# Patient Record
Sex: MALE | Race: WHITE | Employment: OTHER | URBAN - METROPOLITAN AREA
[De-identification: names, ages, dates, MRNs, and addresses within clinical notes are randomized per-mention and may not be internally consistent; named-entity substitution may affect disease eponyms.]

---

## 2017-10-11 ENCOUNTER — HOSPITAL ENCOUNTER (OUTPATIENT)
Dept: ULTRASOUND IMAGING | Age: 77
Discharge: HOME | End: 2017-10-11
Attending: NURSE PRACTITIONER
Payer: MEDICARE

## 2017-10-11 DIAGNOSIS — M79.604 RIGHT LEG PAIN: ICD-10-CM

## 2017-10-11 DIAGNOSIS — M79.89 RIGHT LEG SWELLING: ICD-10-CM

## 2017-10-11 PROCEDURE — 93971 EXTREMITY STUDY: CPT

## 2019-01-01 ENCOUNTER — HOSPITAL ENCOUNTER (OUTPATIENT)
Dept: LAB | Age: 79
Discharge: HOME | End: 2019-05-10

## 2019-01-01 ENCOUNTER — HOSPITAL ENCOUNTER (INPATIENT)
Age: 79
LOS: 5 days | End: 2019-08-19
Attending: INTERNAL MEDICINE | Admitting: INTERNAL MEDICINE

## 2019-01-01 ENCOUNTER — HOSPICE ADMISSION (OUTPATIENT)
Dept: HOSPICE | Facility: HOSPICE | Age: 79
End: 2019-01-01
Payer: MEDICARE

## 2019-01-01 VITALS
WEIGHT: 147 LBS | RESPIRATION RATE: 14 BRPM | HEIGHT: 70 IN | BODY MASS INDEX: 21.05 KG/M2 | DIASTOLIC BLOOD PRESSURE: 53 MMHG | TEMPERATURE: 96.1 F | SYSTOLIC BLOOD PRESSURE: 69 MMHG | HEART RATE: 105 BPM

## 2019-01-01 DIAGNOSIS — F02.818 LATE ONSET ALZHEIMER'S DISEASE WITH BEHAVIORAL DISTURBANCE (HCC): ICD-10-CM

## 2019-01-01 DIAGNOSIS — J18.9 HEALTHCARE-ASSOCIATED PNEUMONIA: ICD-10-CM

## 2019-01-01 DIAGNOSIS — G30.1 LATE ONSET ALZHEIMER'S DISEASE WITH BEHAVIORAL DISTURBANCE (HCC): ICD-10-CM

## 2019-01-01 DIAGNOSIS — J96.01 ACUTE RESPIRATORY FAILURE WITH HYPOXIA (HCC): ICD-10-CM

## 2019-01-01 LAB
BASOPHILS # BLD: 0 K/UL (ref 0–0.2)
BASOPHILS NFR BLD: 0 % (ref 0–2)
DIFFERENTIAL METHOD BLD: ABNORMAL
EOSINOPHIL # BLD: 0.1 K/UL (ref 0–0.8)
EOSINOPHIL NFR BLD: 1 % (ref 0.5–7.8)
ERYTHROCYTE [DISTWIDTH] IN BLOOD BY AUTOMATED COUNT: 13.3 % (ref 11.9–14.6)
HCT VFR BLD AUTO: 43.6 % (ref 41.1–50.3)
HGB BLD-MCNC: 14 G/DL (ref 13.6–17.2)
IMM GRANULOCYTES # BLD AUTO: 0.1 K/UL (ref 0–0.5)
IMM GRANULOCYTES NFR BLD AUTO: 1 % (ref 0–5)
LYMPHOCYTES # BLD: 1.4 K/UL (ref 0.5–4.6)
LYMPHOCYTES NFR BLD: 18 % (ref 13–44)
MCH RBC QN AUTO: 31.1 PG (ref 26.1–32.9)
MCHC RBC AUTO-ENTMCNC: 32.1 G/DL (ref 31.4–35)
MCV RBC AUTO: 96.9 FL (ref 79.6–97.8)
MONOCYTES # BLD: 0.8 K/UL (ref 0.1–1.3)
MONOCYTES NFR BLD: 11 % (ref 4–12)
NEUTS SEG # BLD: 5.3 K/UL (ref 1.7–8.2)
NEUTS SEG NFR BLD: 70 % (ref 43–78)
NRBC # BLD: 0 K/UL (ref 0–0.2)
PLATELET # BLD AUTO: 289 K/UL (ref 150–450)
PMV BLD AUTO: 9.2 FL (ref 9.4–12.3)
RBC # BLD AUTO: 4.5 M/UL (ref 4.23–5.6)
WBC # BLD AUTO: 7.7 K/UL (ref 4.3–11.1)

## 2019-01-01 PROCEDURE — 0656 HSPC GENERAL INPATIENT

## 2019-01-01 PROCEDURE — G0156 HHCP-SVS OF AIDE,EA 15 MIN: HCPCS

## 2019-01-01 PROCEDURE — G0299 HHS/HOSPICE OF RN EA 15 MIN: HCPCS

## 2019-01-01 PROCEDURE — 74011250637 HC RX REV CODE- 250/637: Performed by: INTERNAL MEDICINE

## 2019-01-01 PROCEDURE — 99222 1ST HOSP IP/OBS MODERATE 55: CPT | Performed by: INTERNAL MEDICINE

## 2019-01-01 PROCEDURE — 74011250636 HC RX REV CODE- 250/636: Performed by: INTERNAL MEDICINE

## 2019-01-01 PROCEDURE — 74011250636 HC RX REV CODE- 250/636: Performed by: NURSE PRACTITIONER

## 2019-01-01 PROCEDURE — 74011000250 HC RX REV CODE- 250: Performed by: NURSE PRACTITIONER

## 2019-01-01 PROCEDURE — G0155 HHCP-SVS OF CSW,EA 15 MIN: HCPCS

## 2019-01-01 PROCEDURE — 85025 COMPLETE CBC W/AUTO DIFF WBC: CPT

## 2019-01-01 PROCEDURE — 99232 SBSQ HOSP IP/OBS MODERATE 35: CPT | Performed by: INTERNAL MEDICINE

## 2019-01-01 PROCEDURE — 3336500001 HSPC ELECTION

## 2019-01-01 PROCEDURE — 74011000250 HC RX REV CODE- 250: Performed by: INTERNAL MEDICINE

## 2019-01-01 RX ORDER — HALOPERIDOL 5 MG/ML
2 INJECTION INTRAMUSCULAR
Status: DISCONTINUED | OUTPATIENT
Start: 2019-01-01 | End: 2019-01-01 | Stop reason: HOSPADM

## 2019-01-01 RX ORDER — LEVALBUTEROL 1.25 MG/.5ML
1.25 SOLUTION, CONCENTRATE RESPIRATORY (INHALATION)
COMMUNITY

## 2019-01-01 RX ORDER — MORPHINE SULFATE 4 MG/ML
4 INJECTION INTRAVENOUS
Status: DISCONTINUED | OUTPATIENT
Start: 2019-01-01 | End: 2019-01-01

## 2019-01-01 RX ORDER — HALOPERIDOL 5 MG/ML
2 INJECTION INTRAMUSCULAR
Status: DISCONTINUED | OUTPATIENT
Start: 2019-01-01 | End: 2019-01-01

## 2019-01-01 RX ORDER — SODIUM CHLORIDE 0.9 % (FLUSH) 0.9 %
3 SYRINGE (ML) INJECTION AS NEEDED
Status: DISCONTINUED | OUTPATIENT
Start: 2019-01-01 | End: 2019-01-01

## 2019-01-01 RX ORDER — ACETAMINOPHEN 325 MG/1
650 TABLET ORAL EVERY 8 HOURS
COMMUNITY

## 2019-01-01 RX ORDER — POLYETHYLENE GLYCOL 3350 17 G/17G
17 POWDER, FOR SOLUTION ORAL
COMMUNITY

## 2019-01-01 RX ORDER — GLYCOPYRROLATE 0.2 MG/ML
0.2 INJECTION INTRAMUSCULAR; INTRAVENOUS
Status: DISCONTINUED | OUTPATIENT
Start: 2019-01-01 | End: 2019-01-01

## 2019-01-01 RX ORDER — HALOPERIDOL 2 MG/ML
4 SOLUTION ORAL 2 TIMES DAILY
COMMUNITY

## 2019-01-01 RX ORDER — HYOSCYAMINE SULFATE 0.12 MG/1
0.12 TABLET SUBLINGUAL
Status: DISCONTINUED | OUTPATIENT
Start: 2019-01-01 | End: 2019-01-01

## 2019-01-01 RX ORDER — SENNOSIDES 8.6 MG/1
1 TABLET ORAL 2 TIMES DAILY
Status: DISCONTINUED | OUTPATIENT
Start: 2019-01-01 | End: 2019-01-01

## 2019-01-01 RX ORDER — MORPHINE SULFATE 2 MG/ML
2 INJECTION, SOLUTION INTRAMUSCULAR; INTRAVENOUS
Status: DISCONTINUED | OUTPATIENT
Start: 2019-01-01 | End: 2019-01-01

## 2019-01-01 RX ORDER — LORAZEPAM 2 MG/ML
0.5 INJECTION INTRAMUSCULAR
Status: DISCONTINUED | OUTPATIENT
Start: 2019-01-01 | End: 2019-01-01

## 2019-01-01 RX ORDER — LORAZEPAM 2 MG/ML
1 INJECTION INTRAMUSCULAR
Status: DISCONTINUED | OUTPATIENT
Start: 2019-01-01 | End: 2019-01-01

## 2019-01-01 RX ORDER — MORPHINE SULFATE 100 MG/5ML
10 SOLUTION ORAL
Status: DISCONTINUED | OUTPATIENT
Start: 2019-01-01 | End: 2019-01-01

## 2019-01-01 RX ORDER — GLYCOPYRROLATE 0.2 MG/ML
0.2 INJECTION INTRAMUSCULAR; INTRAVENOUS
Status: DISCONTINUED | OUTPATIENT
Start: 2019-01-01 | End: 2019-01-01 | Stop reason: HOSPADM

## 2019-01-01 RX ORDER — BUSPIRONE HYDROCHLORIDE 5 MG/1
5 TABLET ORAL 2 TIMES DAILY
COMMUNITY

## 2019-01-01 RX ORDER — DIVALPROEX SODIUM 500 MG/1
500 TABLET, DELAYED RELEASE ORAL 2 TIMES DAILY WITH MEALS
COMMUNITY

## 2019-01-01 RX ORDER — MORPHINE SULFATE 10 MG/ML
7 INJECTION, SOLUTION INTRAMUSCULAR; INTRAVENOUS
Status: DISCONTINUED | OUTPATIENT
Start: 2019-01-01 | End: 2019-01-01 | Stop reason: HOSPADM

## 2019-01-01 RX ORDER — HALOPERIDOL 5 MG/ML
5 INJECTION INTRAMUSCULAR EVERY 12 HOURS
Status: DISCONTINUED | OUTPATIENT
Start: 2019-01-01 | End: 2019-01-01

## 2019-01-01 RX ORDER — OLOPATADINE HYDROCHLORIDE 2 MG/ML
1 SOLUTION/ DROPS OPHTHALMIC DAILY
COMMUNITY

## 2019-01-01 RX ORDER — ACETAMINOPHEN 325 MG/1
650 TABLET ORAL
Status: DISCONTINUED | OUTPATIENT
Start: 2019-01-01 | End: 2019-01-01

## 2019-01-01 RX ORDER — DIVALPROEX SODIUM 250 MG/1
750 TABLET, DELAYED RELEASE ORAL
COMMUNITY

## 2019-01-01 RX ORDER — FENTANYL 25 UG/1
1 PATCH TRANSDERMAL
Status: DISCONTINUED | OUTPATIENT
Start: 2019-01-01 | End: 2019-01-01 | Stop reason: HOSPADM

## 2019-01-01 RX ORDER — LORAZEPAM 2 MG/ML
2 INJECTION INTRAMUSCULAR
Status: DISCONTINUED | OUTPATIENT
Start: 2019-01-01 | End: 2019-01-01 | Stop reason: HOSPADM

## 2019-01-01 RX ORDER — FENTANYL 12.5 UG/1
1 PATCH TRANSDERMAL
Status: DISCONTINUED | OUTPATIENT
Start: 2019-01-01 | End: 2019-01-01

## 2019-01-01 RX ORDER — ACETAMINOPHEN 650 MG/1
650 SUPPOSITORY RECTAL
Status: DISCONTINUED | OUTPATIENT
Start: 2019-01-01 | End: 2019-01-01 | Stop reason: HOSPADM

## 2019-01-01 RX ORDER — POLYVINYL ALCOHOL 14 MG/ML
1 SOLUTION/ DROPS OPHTHALMIC AS NEEDED
Status: DISCONTINUED | OUTPATIENT
Start: 2019-01-01 | End: 2019-01-01 | Stop reason: HOSPADM

## 2019-01-01 RX ORDER — HALOPERIDOL 5 MG/ML
5 INJECTION INTRAMUSCULAR EVERY 8 HOURS
Status: DISCONTINUED | OUTPATIENT
Start: 2019-01-01 | End: 2019-01-01 | Stop reason: HOSPADM

## 2019-01-01 RX ORDER — LOPERAMIDE HYDROCHLORIDE 2 MG/1
4 CAPSULE ORAL AS NEEDED
Status: DISCONTINUED | OUTPATIENT
Start: 2019-01-01 | End: 2019-01-01

## 2019-01-01 RX ORDER — FACIAL-BODY WIPES
10 EACH TOPICAL AS NEEDED
Status: DISCONTINUED | OUTPATIENT
Start: 2019-01-01 | End: 2019-01-01 | Stop reason: HOSPADM

## 2019-01-01 RX ORDER — SENNOSIDES 8.6 MG/1
2 TABLET ORAL DAILY
COMMUNITY

## 2019-01-01 RX ORDER — LORAZEPAM 2 MG/ML
2 INJECTION INTRAMUSCULAR
Status: DISCONTINUED | OUTPATIENT
Start: 2019-01-01 | End: 2019-01-01

## 2019-01-01 RX ADMIN — GLYCOPYRROLATE 0.2 MG: 0.2 INJECTION INTRAMUSCULAR; INTRAVENOUS at 12:46

## 2019-01-01 RX ADMIN — MORPHINE SULFATE 4 MG: 4 INJECTION INTRAVENOUS at 06:17

## 2019-01-01 RX ADMIN — ACETAMINOPHEN 650 MG: 650 SUPPOSITORY RECTAL at 14:08

## 2019-01-01 RX ADMIN — MORPHINE SULFATE 4 MG: 4 INJECTION INTRAVENOUS at 19:29

## 2019-01-01 RX ADMIN — HALOPERIDOL LACTATE 5 MG: 5 INJECTION, SOLUTION INTRAMUSCULAR at 16:47

## 2019-01-01 RX ADMIN — MORPHINE SULFATE 2 MG: 2 INJECTION, SOLUTION INTRAMUSCULAR; INTRAVENOUS at 06:16

## 2019-01-01 RX ADMIN — MORPHINE SULFATE 4 MG: 4 INJECTION INTRAVENOUS at 05:10

## 2019-01-01 RX ADMIN — LORAZEPAM 2 MG: 2 INJECTION INTRAMUSCULAR; INTRAVENOUS at 06:17

## 2019-01-01 RX ADMIN — GLYCOPYRROLATE 0.2 MG: 0.2 INJECTION INTRAMUSCULAR; INTRAVENOUS at 06:16

## 2019-01-01 RX ADMIN — HALOPERIDOL LACTATE 2 MG: 5 INJECTION, SOLUTION INTRAMUSCULAR at 03:28

## 2019-01-01 RX ADMIN — HALOPERIDOL LACTATE 5 MG: 5 INJECTION, SOLUTION INTRAMUSCULAR at 02:00

## 2019-01-01 RX ADMIN — HALOPERIDOL LACTATE 2 MG: 5 INJECTION, SOLUTION INTRAMUSCULAR at 05:57

## 2019-01-01 RX ADMIN — MORPHINE SULFATE 2 MG: 2 INJECTION, SOLUTION INTRAMUSCULAR; INTRAVENOUS at 16:26

## 2019-01-01 RX ADMIN — MORPHINE SULFATE 7 MG: 10 INJECTION INTRAVENOUS at 16:03

## 2019-01-01 RX ADMIN — HALOPERIDOL LACTATE 2 MG: 5 INJECTION, SOLUTION INTRAMUSCULAR at 07:08

## 2019-01-01 RX ADMIN — HALOPERIDOL LACTATE 2 MG: 5 INJECTION, SOLUTION INTRAMUSCULAR at 06:33

## 2019-01-01 RX ADMIN — MORPHINE SULFATE 2 MG: 2 INJECTION, SOLUTION INTRAMUSCULAR; INTRAVENOUS at 07:40

## 2019-01-01 RX ADMIN — HALOPERIDOL LACTATE 2 MG: 5 INJECTION, SOLUTION INTRAMUSCULAR at 13:52

## 2019-01-01 RX ADMIN — MORPHINE SULFATE 4 MG: 4 INJECTION INTRAVENOUS at 06:32

## 2019-01-01 RX ADMIN — MORPHINE SULFATE 4 MG: 4 INJECTION INTRAVENOUS at 01:34

## 2019-01-01 RX ADMIN — GLYCOPYRROLATE 0.2 MG: 0.2 INJECTION INTRAMUSCULAR; INTRAVENOUS at 05:10

## 2019-01-01 RX ADMIN — HALOPERIDOL LACTATE 2 MG: 5 INJECTION, SOLUTION INTRAMUSCULAR at 18:21

## 2019-01-01 RX ADMIN — MORPHINE SULFATE 4 MG: 4 INJECTION INTRAVENOUS at 08:26

## 2019-01-01 RX ADMIN — LORAZEPAM 2 MG: 2 INJECTION INTRAMUSCULAR; INTRAVENOUS at 13:24

## 2019-01-01 RX ADMIN — HALOPERIDOL LACTATE 2 MG: 5 INJECTION, SOLUTION INTRAMUSCULAR at 07:39

## 2019-01-01 RX ADMIN — MORPHINE SULFATE 7 MG: 10 INJECTION INTRAVENOUS at 04:29

## 2019-01-01 RX ADMIN — MORPHINE SULFATE 2 MG: 2 INJECTION, SOLUTION INTRAMUSCULAR; INTRAVENOUS at 12:07

## 2019-01-01 RX ADMIN — LORAZEPAM 2 MG: 2 INJECTION INTRAMUSCULAR; INTRAVENOUS at 08:51

## 2019-01-01 RX ADMIN — HALOPERIDOL LACTATE 2 MG: 5 INJECTION, SOLUTION INTRAMUSCULAR at 11:42

## 2019-01-01 RX ADMIN — MORPHINE SULFATE 4 MG: 4 INJECTION INTRAVENOUS at 11:41

## 2019-01-01 RX ADMIN — GLYCOPYRROLATE 0.2 MG: 0.2 INJECTION INTRAMUSCULAR; INTRAVENOUS at 13:00

## 2019-01-01 RX ADMIN — GLYCOPYRROLATE 0.2 MG: 0.2 INJECTION INTRAMUSCULAR; INTRAVENOUS at 14:09

## 2019-01-01 RX ADMIN — MORPHINE SULFATE 7 MG: 10 INJECTION INTRAVENOUS at 07:00

## 2019-01-01 RX ADMIN — MORPHINE SULFATE 2 MG: 2 INJECTION, SOLUTION INTRAMUSCULAR; INTRAVENOUS at 20:34

## 2019-01-01 RX ADMIN — MORPHINE SULFATE 4 MG: 4 INJECTION INTRAVENOUS at 14:08

## 2019-01-01 RX ADMIN — MORPHINE SULFATE 7 MG: 10 INJECTION INTRAVENOUS at 16:48

## 2019-01-01 RX ADMIN — LORAZEPAM 1 MG: 2 INJECTION INTRAMUSCULAR; INTRAVENOUS at 14:09

## 2019-01-01 RX ADMIN — HALOPERIDOL LACTATE 2 MG: 5 INJECTION, SOLUTION INTRAMUSCULAR at 18:02

## 2019-01-01 RX ADMIN — HALOPERIDOL LACTATE 2 MG: 5 INJECTION, SOLUTION INTRAMUSCULAR at 17:14

## 2019-01-01 RX ADMIN — MORPHINE SULFATE 2 MG: 2 INJECTION, SOLUTION INTRAMUSCULAR; INTRAVENOUS at 20:10

## 2019-01-01 RX ADMIN — LORAZEPAM 1 MG: 2 INJECTION INTRAMUSCULAR; INTRAVENOUS at 19:26

## 2019-01-01 RX ADMIN — MORPHINE SULFATE 4 MG: 4 INJECTION INTRAVENOUS at 18:02

## 2019-01-01 RX ADMIN — GLYCOPYRROLATE 0.2 MG: 0.2 INJECTION INTRAMUSCULAR; INTRAVENOUS at 01:34

## 2019-01-01 RX ADMIN — MORPHINE SULFATE 4 MG: 4 INJECTION INTRAVENOUS at 18:22

## 2019-01-01 RX ADMIN — GLYCOPYRROLATE 0.2 MG: 0.2 INJECTION INTRAMUSCULAR; INTRAVENOUS at 19:28

## 2019-01-01 RX ADMIN — MORPHINE SULFATE 4 MG: 4 INJECTION INTRAVENOUS at 07:07

## 2019-01-01 RX ADMIN — MORPHINE SULFATE 7 MG: 10 INJECTION INTRAVENOUS at 08:03

## 2019-01-01 RX ADMIN — HALOPERIDOL LACTATE 5 MG: 5 INJECTION, SOLUTION INTRAMUSCULAR at 00:50

## 2019-01-01 RX ADMIN — HALOPERIDOL LACTATE 5 MG: 5 INJECTION, SOLUTION INTRAMUSCULAR at 16:00

## 2019-01-01 RX ADMIN — GLYCOPYRROLATE 0.2 MG: 0.2 INJECTION INTRAMUSCULAR; INTRAVENOUS at 08:04

## 2019-01-01 RX ADMIN — MORPHINE SULFATE 4 MG: 4 INJECTION INTRAVENOUS at 03:28

## 2019-01-01 RX ADMIN — LORAZEPAM 1 MG: 2 INJECTION INTRAMUSCULAR; INTRAVENOUS at 19:28

## 2019-01-01 RX ADMIN — HALOPERIDOL LACTATE 5 MG: 5 INJECTION, SOLUTION INTRAMUSCULAR at 08:04

## 2019-01-01 RX ADMIN — HALOPERIDOL LACTATE 5 MG: 5 INJECTION INTRAMUSCULAR at 21:00

## 2019-01-01 RX ADMIN — MORPHINE SULFATE 4 MG: 4 INJECTION INTRAVENOUS at 13:00

## 2019-01-01 RX ADMIN — MORPHINE SULFATE 7 MG: 10 INJECTION INTRAVENOUS at 12:46

## 2019-01-01 RX ADMIN — HALOPERIDOL LACTATE 5 MG: 5 INJECTION INTRAMUSCULAR at 08:26

## 2019-01-01 RX ADMIN — GLYCOPYRROLATE 0.2 MG: 0.2 INJECTION INTRAMUSCULAR; INTRAVENOUS at 12:00

## 2019-01-01 RX ADMIN — HALOPERIDOL LACTATE 2 MG: 5 INJECTION, SOLUTION INTRAMUSCULAR at 06:17

## 2019-01-01 RX ADMIN — HALOPERIDOL LACTATE 5 MG: 5 INJECTION, SOLUTION INTRAMUSCULAR at 08:17

## 2019-01-01 RX ADMIN — GLYCOPYRROLATE 0.2 MG: 0.2 INJECTION INTRAMUSCULAR; INTRAVENOUS at 20:13

## 2019-01-01 RX ADMIN — MORPHINE SULFATE 7 MG: 10 INJECTION INTRAVENOUS at 03:10

## 2019-08-13 PROBLEM — J18.9 HEALTHCARE-ASSOCIATED PNEUMONIA: Status: ACTIVE | Noted: 2019-01-01

## 2019-08-13 PROBLEM — G30.9 ALZHEIMER'S DISEASE (HCC): Status: ACTIVE | Noted: 2019-01-01

## 2019-08-13 PROBLEM — J96.91 RESPIRATORY FAILURE WITH HYPOXIA (HCC): Status: ACTIVE | Noted: 2019-01-01

## 2019-08-13 PROBLEM — F02.80 ALZHEIMER'S DISEASE (HCC): Status: ACTIVE | Noted: 2019-01-01

## 2019-08-14 PROBLEM — A41.9 SEVERE SEPSIS (HCC): Status: ACTIVE | Noted: 2019-01-01

## 2019-08-14 PROBLEM — R45.1 RESTLESSNESS AND AGITATION: Status: ACTIVE | Noted: 2017-11-30

## 2019-08-14 PROBLEM — R53.81 DEBILITY: Status: ACTIVE | Noted: 2017-11-05

## 2019-08-14 PROBLEM — I25.810 CORONARY ARTERY DISEASE INVOLVING CORONARY BYPASS GRAFT OF NATIVE HEART: Status: ACTIVE | Noted: 2017-11-06

## 2019-08-14 PROBLEM — F32.A DEPRESSIVE DISORDER: Status: ACTIVE | Noted: 2017-11-30

## 2019-08-14 PROBLEM — R13.19 OTHER DYSPHAGIA: Status: ACTIVE | Noted: 2017-11-30

## 2019-08-14 PROBLEM — F80.9 COMMUNICATION DISORDER: Status: ACTIVE | Noted: 2017-11-30

## 2019-08-14 PROBLEM — E87.20 LACTIC ACIDOSIS: Status: ACTIVE | Noted: 2019-01-01

## 2019-08-14 PROBLEM — R65.20 SEVERE SEPSIS (HCC): Status: ACTIVE | Noted: 2019-01-01

## 2019-08-14 PROBLEM — D64.9 ANEMIA: Status: ACTIVE | Noted: 2017-12-07

## 2019-08-14 PROBLEM — G30.1 LATE ONSET ALZHEIMER'S DISEASE WITH BEHAVIORAL DISTURBANCE (HCC): Status: ACTIVE | Noted: 2017-11-06

## 2019-08-14 PROBLEM — F02.818 LATE ONSET ALZHEIMER'S DISEASE WITH BEHAVIORAL DISTURBANCE (HCC): Status: ACTIVE | Noted: 2017-11-06

## 2019-08-14 NOTE — PROGRESS NOTES
Problem: Pressure Injury - Risk of  Goal: *Prevention of pressure injury  Description  Document Raul Scale and appropriate interventions in the flowsheet. Outcome: Progressing Towards Goal  Note:   Pressure Injury Interventions:  Sensory Interventions: Assess changes in LOC, Avoid rigorous massage over bony prominences, Check visual cues for pain, Float heels, Pressure redistribution bed/mattress (bed type), Turn and reposition approx. every two hours (pillows and wedges if needed), Minimize linen layers, Assess need for specialty bed    Moisture Interventions: Absorbent underpads, Assess need for specialty bed, Internal/External urinary devices, Maintain skin hydration (lotion/cream), Moisture barrier, Limit adult briefs, Minimize layers, Apply protective barrier, creams and emollients    Activity Interventions: Pressure redistribution bed/mattress(bed type)    Mobility Interventions: Float heels, Assess need for specialty bed    Nutrition Interventions: Document food/fluid/supplement intake, Offer support with meals,snacks and hydration    Friction and Shear Interventions: Apply protective barrier, creams and emollients, Minimize layers, Transferring/repositioning devices                Problem: Falls - Risk of  Goal: *Absence of Falls  Description  Document Audi Mccord Fall Risk and appropriate interventions in the flowsheet.   Outcome: Progressing Towards Goal  Note:   Fall Risk Interventions:       Mentation Interventions: Adequate sleep, hydration, pain control, Bed/chair exit alarm, Door open when patient unattended, Evaluate medications/consider consulting pharmacy, Update white board, Room close to nurse's station    Medication Interventions: Bed/chair exit alarm, Evaluate medications/consider consulting pharmacy    Elimination Interventions: Bed/chair exit alarm, Call light in reach    History of Falls Interventions: Bed/chair exit alarm, Door open when patient unattended, Evaluate medications/consider consulting pharmacy         Problem: Anticipatory Grief  Goal: Explore reactions to and verbalize acceptance of impending loss  Description  Patient/family/caregiver will explore reactions to and verbalize acceptance of impending loss. Outcome: Progressing Towards Goal  Note:   Routine referral to bereavement. Problem: Anxiety/Agitation  Goal: Verbalize and demonstrate ability to manage anxiety  Description  The patient/family/caregiver will verbalize and demonstrate ability to manage the patient's anxiety throughout hospice care. Outcome: Progressing Towards Goal  Note:   PRN haldol or ativan if patient becomes agitated. Problem: Coping and Emotional Distress  Goal: Demonstrate acceptance of terminal illness and understanding of disease progression  Description  Patient/family/caregiver will demonstrate acceptance of terminal disease and understanding of disease progression while employing appropriate coping mechanisms. Outcome: Progressing Towards Goal  Note:   Routine referral to social work. Problem: Spiritual Distress  Goal: Distress heard, acknowledged, and addressed  Description  Patient/family/caregiver distress will be heard, acknowledged, and addressed throughout hospice care. Outcome: Progressing Towards Goal  Note:   Routine referral to spiritual care. Problem: Dyspnea Due to End of Life  Goal: Demonstrate understanding of and ability to manage respiratory symptoms at end of life  Outcome: Progressing Towards Goal  Note:   Morphine for cough/dyspnea, suctioning PRN, glycopyrrolate for secretions. Oxygen currently at 2 liters via nasal cannula. Problem: Patient Education: Go to Patient Education Activity  Goal: Patient/Family Education  Outcome: Resolved/Met  Note:   Due to patient condition, educated patient's son Katia Funes on pressure reduction techniques employed at [employer].      Problem: Patient Education: Go to Patient Education Activity  Goal: Patient/Family Education  Outcome: Resolved/Met  Note:   Due to patient condition, educated patient's son Tejinder Villagomez on fall prevention interventions at [facility] including tab alarm, call light, door open when unattended. Problem: Hospice Orientation  Goal: Demonstrate understanding of hospice philosophy, plan of care, and home hospice program  Description  The patient/family/caregiver will demonstrate understanding of hospice philosophy, plan of care and the home hospice program as evidenced by participation in meeting the patient's psychosocial, spiritual, medical, and physical needs inclusive of medical supplies/equipment focusing on symptoms. Outcome: Resolved/Met  Note:   Due to patient condition, oriented patient's son Tejinder Villagomez on [facility] facility, philosophy of care, and interdisciplinary team members.

## 2019-08-14 NOTE — H&P
History and Physical    Patient: Gustavo Simon MRN: [id number]  SSN: [id number]    YOB: 1940  Age: 70 y.o. Sex: male      Subjective: Gustavo Simon is a 70 y.o. male who has a hospice diagnosis of hypoxic respiratory failure. Hospice associated diagnoses are advanced Alzheimer's disease, healthcare associated pneumonia with sepsis, history of CABG and PAD. He has had 2 admissions to the hospital this summer for pneumonia and respiratory failure. In July, he was hospitalized and then sent to rehab. Unfortunately, he developed respiratory distress and was readmitted to the hospital and found to have recurrent pneumonia. He was placed on ventilatory support and was weaned off but was still having respiratory distress. He had an NG feeding tube placed due to dysphagia and aspiration. NG tube was removed and he has been unable to take po meds due to dysphagia. Due to his recent decline, his family has elected to forgo further medical treatment and pursue comfort measures with hospice care. Without further treatment, his life expectancy is less than 2 weeks. Patient admitted [facility] with hypoxic respiratory failure for management of dyspnea, dysphagia and agitation.     Past Medical History:   Diagnosis Date    Anemia     Aneurysm of internal iliac artery (HCC)     Anxiety disorder     AR (allergic rhinitis)     CAD (coronary artery disease)     Cancer of skin 2014    malignant cells removed from right eye    Chronic pain     Dementia     Diarrhea     chronic, post colectomy    Diverticulitis     possible    ED (erectile dysfunction)     Esophagitis     H/O colectomy     [place] (hard of hearing)     Hypertension     IBS (irritable bowel syndrome)     IBS (irritable bowel syndrome) 1/14/2015    Kidney stone     Mini stroke (HCC)     Osteoarthritis     Peripheral neuropathy     Peripheral vascular disease (HCC)     Scalp laceration     Skin callus     squamous cell    Umbilical hernia      Past Surgical History:   Procedure Laterality Date    HX COLECTOMY      had colostomy for 3 months and then reversed    HX CORONARY ARTERY BYPASS GRAFT N/A     HX HERNIA REPAIR  2017    HX MALIGNANT SKIN LESION EXCISION Left 2015    skin cancer removed from left eye with eyelid reconstruction.  HX OPEN CHOLECYSTECTOMY      HX OTHER SURGICAL      Right internal iliac artery aneurysm sp coiling and plugging in      HX OTHER SURGICAL      cardiac surgery    HX OTHER SURGICAL  2015    eye surgery-retraction repair    HX OTHER SURGICAL  2015    2nd stage lid reconstruction    HX OTHER SURGICAL  2005    sinus surgery      Family History   Problem Relation Age of Onset    Heart Disease Sister     Heart Disease Brother     Stroke Other     Hypertension Other      Social History     Tobacco Use    Smoking status: Former Smoker     Last attempt to quit:      Years since quittin.6    Smokeless tobacco: Never Used   Substance Use Topics    Alcohol use: No      Prior to Admission medications    Medication Sig Start Date End Date Taking? Authorizing Provider   acetaminophen (TYLENOL) 325 mg tablet Take 650 mg by mouth every eight (8) hours. Yes Provider, Historical   busPIRone (BUSPAR) 5 mg tablet Take 5 mg by mouth two (2) times a day. Yes Provider, Historical   divalproex DR (DEPAKOTE) 250 mg tablet Take 750 mg by mouth nightly. Yes Provider, Historical   divalproex DR (DEPAKOTE) 500 mg tablet Take 500 mg by mouth two (2) times daily (with meals). Yes Provider, Historical   haloperidol (HALDOL) 2 mg/mL oral concentrate Take 4 mg by mouth two (2) times a day. Yes Provider, Historical   levalbuterol (XOPENEX) 1.25 mg/0.5 mL nebu 1.25 mg by Nebulization route every four (4) hours as needed (wheezing). Yes Provider, Historical   olopatadine (PATADAY) 0.2 % drop ophthalmic solution Administer 1 Drop to both eyes daily.    Yes Provider, Historical   polyethylene glycol (MIRALAX) 17 gram packet Take 17 g by mouth daily as needed. Yes Provider, Historical   senna (SENNA) 8.6 mg tablet Take 2 Tabs by mouth daily. Yes Provider, Historical        Allergies   Allergen Reactions    Prednisone Itching and Anxiety     Insomnia. Nervousness.  Fd And C No.5 (Tartrazine) Unknown (comments)    Iodinated Contrast Media Unknown (comments)    Prednisolone Unknown (comments)       Review of Systems:  Review of systems not obtained due to patient factors. Objective:     Vitals:    08/14/19 1100 08/14/19 1611   BP: 147/82    Pulse: 78    Resp: 16    Temp: 99.4 °F (37.4 °C)    Weight:  66.7 kg (147 lb)   Height:  5' 10\" (1.778 m)        Physical Exam:  GENERAL: fatigued, cooperative, mild distress, appears older than stated age, cachectic, disoriented  LUNG: Coarse breath sounds with expiratory wheezes. Labored respirations. HEART: regular rate and rhythm  ABDOMEN: soft, non-tender. Bowel sounds hypoactive. : Tony catheter with maxime urine. Inserted 8/6/19. EXTREMITIES: mild upper ext. Edema. Weak pulses. SKIN: Stage 2 to sacrum. Abrasion to the bridge of the nose. Mottling to right ankle and foot. RIght ankle and foot are cold. Other ext are warm. NEUROLOGIC: Garbled speech. Fatigued. Generalized weakness. Bedbound. Unable to follow commands.    PSYCHIATRIC: agitated    Assessment:     Hospital Problems  Date Reviewed: 8/14/2019          Codes Class Noted POA    * (Principal) Respiratory failure with hypoxia [facility]) ICD-10-CM: J96.91  ICD-9-CM: 518.81  8/13/2019 Unknown        Healthcare-associated pneumonia ICD-10-CM: J18.9  ICD-9-CM: 085  8/13/2019 Unknown        Late onset Alzheimer's disease with behavioral disturbance ICD-10-CM: G30.1, F02.81  ICD-9-CM: 331.0, 294.11  11/6/2017 Unknown    Overview Signed 8/14/2019  2:59 PM by Brenden García, NP                  Plan:     Current Facility-Administered Medications   Medication Dose Route Frequency    acetaminophen (TYLENOL) suppository 650 mg  650 mg Rectal Q3H PRN    bisacodyl (DULCOLAX) suppository 10 mg  10 mg Rectal PRN    haloperidol lactate (HALDOL) injection 2 mg  2 mg SubCUTAneous Q1H PRN    Or    haloperidol lactate (HALDOL) injection 2 mg  2 mg IntraVENous Q1H PRN    morphine injection 2 mg  2 mg SubCUTAneous Q20MIN PRN    Or    morphine injection 2 mg  2 mg IntraVENous Q20MIN PRN    glycopyrrolate (ROBINUL) injection 0.2 mg  0.2 mg SubCUTAneous Q4H PRN    haloperidol lactate (HALDOL) injection 2 mg  2 mg SubCUTAneous PCD    LORazepam (ATIVAN) injection 1 mg  1 mg IntraMUSCular Q2H PRN       Admitted [facility] with hypoxic respiratory failure for management of dyspnea, dysphagia and agitation. 1. Dyspnea: Morphine 2mg IV/SQ Q20 minutes as needed. Duonebs q4 prn. Glycopyrrolate 0.2mg q4 prn secretions. Oxygen at 2 L/min prn.    2. Dysphagia: Diet as tolerated. Keep HOB elevated with po intake. Oral suction as needed. 3. Agitation: Haloperidol 2mg IV/SQ daily at 1800 and Q1 hour prn. Lorazepam 1mg IV/IM q4 hours prn if haldol ineffective. 4. Family/Pt Support: Family at bedside during exam. Medications and plan of care discussed with nursing staff and family. Will continue to monitor for symptoms and adjust medications as needed to maintain patient comfort. PPS 20%. Case discussed with Dr. Dinorah Burnham. Spoke with the patient's son Brunilda Messina regarding danger of aspiration with po intake. Advised him that his father's pneumonia is likely to return due to stopping aggressive measures and an aspiration pneumonia would be likely to occur with any po intake. Brunilda Messina gives permission for comfort feeding and is aware of the risk.         Signed By: Colette Garcia NP     August 14, 2019

## 2019-08-14 NOTE — PROGRESS NOTES
70year old man patient admitted to room 116 with hospice diagnosis of hypoxic respiratory failure. Level of care is general inpatient for management of dyspnea and agitation. Upon arrival patient is alert with his eyes open but he does not respond to RN's questions. He does make eye contact and tracks his son around the room. FLACC score is 0/10 but patient is very stiff when turned. Patient is on 2 liters of oxygen via nasal cannula. He has a loose distressing cough, he did have to be suctioned orally in the hospital.  He has had recurrent aspiration pneumonia most recently resulting in admission to ICU at [facility] on 8/3/19. He was on nasogastric tube feeding until yesterday at which point his sons decided to pursue comfort care for him as providers recommended against PEG tube placement. PO intake has not been attempted since NG tube was removed. Patient required morphine IV and ativan IV for pain, dyspnea, and agitation in the hospital.  [facility], removed by this RN. 1200 Gave glycopyrrolate 0.2 mg SC for secretions. 1207  Gave morphine 2 mg SC for cough and as premed for suctioning. Suctioned patient orally with Chastity Philippe. He tries to bite down on it but RN was able to obtain moderate amount of thick white/tan mucous. After suctioning he is resting more quietly, only coughed once more and then breathing is regular and unlabored. Son Shari Rucker is torn between giving his dad medication and wanting him to be awake. Patient has had a steep decline since a fall in February (6 months ago) at which point he was living at home alone. He has been in a nursing home since then. He is a DNR and purchased a [employer] plot years ago but no arrangements have been made with a  home. Admission complete and initial general hospice care plan initiated which includes spiritual, psychosocial, and bereavement.    has already seen patient and son for initial assessment. No urgent spiritual or bereavement needs identified on admission by RN. IDG team made aware of plan of care and immediate needs. 1600 Patient awake and calling out [facility]! \"  He is extremely hard of hearing and right ear is better per son. Patient still not responding to RN directly, just yelling out \"Help me! \"  RN offered him water on mouth sponge. He chews the sponge and swallows. Repeated this 3 times with water and twice with apple juice. Attempted yogurt and patient ate entire carton of strawberry yogurt. No cough or sign of distress when being fed but less than 5 minutes later patient is yelling and coughing loudly. 1626 Medicated with morphine 2 mg SC for cough. Attempted to suction orally but unable to obtain any sputum. Even with RN present in the room, patient occasionally yelling \"HELP! \"    8605 Gave scheduled dose of haldol 2 mg SC. Patient is resting more calmly now. 1208 [facility] patient's son Brody Alu and updated him on the last 6 hours. He is disappointed that patient still seems to be aspirating but says \"I guess that's what we expected. I just was hoping maybe they were wrong. \"  Assured him we will keep his father as comfortable as possible but still offer food and fluids when he is alert enough to swallow. He is thankful for the update. 1915 Patient yelling out \"Help! \"  When RN enters room, he makes eye contact but does not verbalize or acknowledge RN's statements even when I speak loudly directly in his ear. 1926 Gave lorazepam 1 mg IM for agitation. Report given to Bushra Hannon RN.

## 2019-08-14 NOTE — [facility] IDG SOCIAL WORKER NOTES
Patient: Dorothy Barnes    Date: 19  Time: 10:48 AM    [facility]  Notes  70year old man patient admitted to room 116 with hospice diagnosis of hypoxic respiratory failure. Level of care is general inpatient for management of dyspnea and agitation. Upon arrival patient is alert with his eyes open but he does not respond to RN's questions. He does make eye contact and tracks his son around the room. FLACC score is 0/10 but patient is very stiff when turned. Patient is on 2 liters of oxygen via nasal cannula. He has a loose distressing cough, he did have to be suctioned orally in the hospital.  He has had recurrent aspiration pneumonia most recently resulting in admission to ICU at [facility] on 8/3/19. He was on nasogastric tube feeding until yesterday at which point his sons decided to pursue comfort care for him as providers recommended against PEG tube placement. PO intake has not been attempted since NG tube was removed. Patient required morphine IV and ativan IV for pain, dyspnea, and agitation in the hospital.  His peripheral IV was infiltrated upon arrival to [facility], removed by this RN. 1200 Gave glycopyrrolate 0.2 mg SC for secretions. 1207  Gave morphine 2 mg SC for cough and as premed for suctioning. Suctioned patient orally with Carter Ridley. He tries to bite down on it but RN was able to obtain moderate amount of thick white/tan mucous. After suctioning he is resting more quietly, only coughed once more and then breathing is regular and unlabored. Son Julieta Wakefield is torn between giving his dad medication and wanting him to be awake. Patient has had a steep decline since a fall in February (6 months ago) at which point he was living at home alone. He has been in a nursing home since then. He is a DNR and purchased a [place] plot years ago but no arrangements have been made with a  home.     Admission complete and initial general hospice care plan initiated which includes spiritual, psychosocial, and bereavement.  has already seen patient and son for initial assessment. No urgent spiritual or bereavement needs identified on admission by RN. IDG team made aware of plan of care and immediate needs.     Signed by: Izaiah Vyas RN

## 2019-08-14 NOTE — MED STUDENT NOTES
History and Physical 
 
Patient: Dixon Conteh MRN: [id number]  SSN: [id number] YOB: 1940  Age: 70 y.o. Sex: male Subjective: Dixon Conteh is a 70 y.o. male who presents to [facility] via [address] from [facility]. He is very short of breath and presents with an altered mental status consistent with his dementia. His son reports that he was hospitalized in July for respiratory distress and was discharged to a local rehabilitation facility. He was again hospitalized within the past week and, after increasing illness he was accepted for Past Medical History:  
Diagnosis Date  Anemia  Anxiety disorder  AR (allergic rhinitis)  CAD (coronary artery disease)  Cancer of skin 2014  
 malignant cells removed from right eye  Chronic pain  Diarrhea   
 chronic, post colectomy  Diverticulitis   
 possible  ED (erectile dysfunction)  Esophagitis  H/O colectomy  Hypertension  IBS (irritable bowel syndrome)  IBS (irritable bowel syndrome) 1/14/2015  Peripheral neuropathy  Peripheral vascular disease ([facility] [address]  Scalp laceration Past Surgical History:  
Procedure Laterality Date  HX COLECTOMY History reviewed. No pertinent family history. Social History Tobacco Use  Smoking status: Never Smoker Substance Use Topics  Alcohol use: No  
  
Prior to Admission medications Medication Sig Start Date End Date Taking? Authorizing Provider  
ibuprofen (MOTRIN) 800 mg tablet Take 1 Tab by mouth two (2) times a day. 11/3/17  Yes Letty Loaiza MD  
escitalopram oxalate (LEXAPRO) 10 mg tablet Take 1 Tab by mouth daily. 10/11/17  Yes Efren Rausch NP Allergies Allergen Reactions  Iodinated Contrast Media Unknown (comments)  Prednisolone Unknown (comments) Review of Systems: 
{Ros - complete:[id number]} Objective:  
 
Vitals:  
 08/14/19 1100 BP: 147/82 Pulse: 78  
 Resp: 16 Temp: 99.4 °F (37.4 °C) Physical Exam: 
{Exam, Complete:04193163:p} Assessment:  
 
Hospital Problems  Date Reviewed: 8/14/2019 Codes Class Noted POA * (Principal) Respiratory failure with hypoxia [facility]) ICD-10-CM: J96.91 
ICD-9-CM: 518.81  8/13/2019 Unknown Healthcare-associated pneumonia ICD-10-CM: J18.9 ICD-9-CM: 749  8/13/2019 Unknown Alzheimer's disease ICD-10-CM: G30.9, F02.80 ICD-9-CM: 331.0  8/13/2019 Unknown Plan:  
 
Current Facility-Administered Medications Medication Dose Route Frequency  sodium chloride (NS) flush 3 mL  3 mL IntraVENous PRN  
 acetaminophen (TYLENOL) suppository 650 mg  650 mg Rectal Q3H PRN  
 bisacodyl (DULCOLAX) suppository 10 mg  10 mg Rectal PRN  
 glycopyrrolate (ROBINUL) injection 0.2 mg  0.2 mg IntraVENous Q4H PRN  
 LORazepam (ATIVAN) injection 0.5 mg  0.5 mg IntraVENous Q2H PRN  
 haloperidol lactate (HALDOL) injection 2 mg  2 mg SubCUTAneous Q1H PRN Or  
 haloperidol lactate (HALDOL) injection 2 mg  2 mg IntraVENous Q1H PRN  
 morphine injection 2 mg  2 mg SubCUTAneous Q20MIN PRN Or  
 morphine injection 2 mg  2 mg IntraVENous Q20MIN PRN  
 
 
*** Signed By: Ted Deleon August 14, 2019 *ATTENTION:  This note has been created by a medical student for educational purposes only. Please do not refer to the content of this note for clinical decision-making, billing, or other purposes. Please see attending physicians note to obtain clinical information on this patient. *

## 2019-08-14 NOTE — PROGRESS NOTES
\"Chong\"        Circumstances for Admission: Pt was admitted to [facility] on 8/14/19 from [place].  He was transported to us via EMS to room 116. His hospice diagnosis is respiratory failure. On  8/13/2019   Dr. Ja Carter  verbally certified  that   Jorge Houser  is terminally ill with a life expectancy of 6 months or less if the terminal illness runs its normal course. Verbal certification received by:  Kalpana Aldana RN   on  8/13/2019  @      1300       DIAGNOSIS: Hypoxic Respiratory Failure (advanced Alzheimer's disease, healthcare associated pneumonia, sepsis, remote CABG, peripheral arterial disease)   SUMMARY: 82-year-old male living in at home with moderately advanced Alzheimer's disease who was hospitalized in July for pneumonia and respiratory failure. He was discharged to a local rehabilitation facility but several days ago developed increasing shortness of breath and respiratory distress requiring hospitalization and invasive ventilatory assistance. He has been gradually weaned to moderate flow oxygen but is still significantly short of breath complicated by increasing confusion and decreasing oral intake. He has been requiring parenteral medications for comfort measures. His feeding tube has been removed. He has been transferred out of ICU. He is a DNR. The patient's family desires no additional aggressive diagnostic or therapeutic interventions and the attending staff feels that these would be of no benefit. CERTIFICATION: Dalton Zee is appropriate for hospice level care. Death is likely in the next few weeks with the outside possibility of stabilization. He will be admitted to [facility] when a bed is available for treatment of his respiratory distress, agitation and likely for end-of-life care. Patient-Family/Social History: pt is a 70year old  man. He is  to [facility]. They have 2 children Kelli Aldana and Peter. Pt was not in the [employer].   He worked at [email or web address] which used to be [employer]. He goes by Uyen Mckeon. He loves country music and he is very [place]. Payor Source: [employer] / History:  Pt is financially secure. No needs at this time. He does have a $6,100 life insurance policy. Spritiual Needs:  Pt attends [facility]  Per son has not been there in years due to illness. Home Environment: Pt and his spouse both live at [facility]. He moved in in 2019. Spouse has been there 3 years she is in memory care. Patient's Emotional and Cognitive Status:       Primary Caregiver and Resources used/needed at home Facility staff were primary caregivers       Advance Directive/HCPOA / DNR Status: Son Julieta Wakefield is [address]. He forgot the paperwork this am, but he will bring it or fax it. Pt is a DNR. Final Arrangements: Julieta Wakefield is not sure what the plans will be yet. Pt has a small life ins policy. He said his parents have a plot at [employer]. He is trying to figure out how he can make his arrangements and keep it under his policy payout. We discussed opening and closing costs, headstone costs and cremation vs. Burial.  He was given resources for the area. Son is not from [place] so he is unfamiliar with the resources locally. LMSW advised him to let me know what  home he has chosen. Bereavement Risk: LOW risk normal grief reaction is expected. Spouse has dementia and has no awareness of the illness of the pt. Son Julieta Wakefield said she no longer remembers ay ot them. Discharge Planning: Symptom management at this time. No discharge plan in place yet.         Referrals Made:       __________  Volunteer  __X______  Bereavement ________  [employer] ______  [place] information _______

## 2019-08-14 NOTE — [facility] IDG SOCIAL WORKER NOTES
Patient: Risa Arvizu    Date: 08/14/19  Time: 4:40 PM    [facility]  Notes  LMSW has reviewed the initial Rn Comprehensive assessment and plan of care. Acknowledge there is no immediate needs for SW.          Signed by: David Mendoza

## 2019-08-15 NOTE — [facility] IDG CHAPLAIN NOTES
Patient: Ean Garland    Date: 08/15/19  Time: 10:55 AM    [facility]  Notes   has reviewed  Initial Comprehensive Assessment and Initial Plan of Care for [facility].  is in agreement with the plans put in place and goals set thus far.  will be making a Spiritual and bereavement Assessment to determine needs that can be supported [facility].           Signed by: Lidia Mena

## 2019-08-15 NOTE — [facility] IDG VOLUNTEER NOTES
Patient: Misael Canales    Date: 08/15/19  Time: 11:55 AM    [facility] Volunteer Notes  VC has reviewed the initial RN Comprehensive assessment and the plan of care. VC is in agreement with the plan of care. Pt will receive general support by volunteers as evidenced by comfort bag delivery, snack cart, supplies to the room, companionship visits, pet therapy and/or therapeutic music.              Signed by: Jewell Block

## 2019-08-15 NOTE — PROGRESS NOTES
Problem: Grieving  Goal: *Able to identify stages of grieving process  Outcome: Progressing Towards Goal     Problem: Pressure Injury - Risk of  Goal: *Prevention of pressure injury  Description  Document Raul Scale and appropriate interventions in the flowsheet. Outcome: Progressing Towards Goal  Note:   Pressure Injury Interventions:  Sensory Interventions: Assess changes in LOC, Avoid rigorous massage over bony prominences, Check visual cues for pain, Float heels, Pressure redistribution bed/mattress (bed type), Turn and reposition approx. every two hours (pillows and wedges if needed), Minimize linen layers, Assess need for specialty bed    Moisture Interventions: Absorbent underpads, Assess need for specialty bed, Internal/External urinary devices, Maintain skin hydration (lotion/cream), Moisture barrier, Limit adult briefs, Minimize layers, Apply protective barrier, creams and emollients    Activity Interventions: Pressure redistribution bed/mattress(bed type)    Mobility Interventions: Float heels, Assess need for specialty bed    Nutrition Interventions: Document food/fluid/supplement intake, Offer support with meals,snacks and hydration    Friction and Shear Interventions: Apply protective barrier, creams and emollients, Minimize layers, Transferring/repositioning devices                Problem: Pain  Goal: Verbalize satisfaction of level of comfort and symptom control  Outcome: Progressing Towards Goal     Problem: Anxiety/Agitation  Goal: Verbalize and demonstrate ability to manage anxiety  Description  The patient/family/caregiver will verbalize and demonstrate ability to manage the patient's anxiety throughout hospice care.   Outcome: Progressing Towards Goal

## 2019-08-15 NOTE — [facility] IDG OTHER NOTES
Bereavement Coordinator has reviewed and agrees with RN Initial Comprehensive Assessment and Care Plan. I acknowledge no urgent Bereavement Care needs identified at time of admission.

## 2019-08-15 NOTE — PROGRESS NOTES
The patient report taken and walking rounds completed with the off going RN at 4327. The patient is here GIP with respiratory failure. We are treating symptoms of pain, dyspnea and agitation. Patient is resting quietly in the bed at this time. No signs of anxiety, SOB, nausea or vomit or pain. The bed is low and locked and the call light is within the reach of the patient. 3229- The patient is now groaning and pulling at his harris. He is not stating that he is in pain but is grimacing and groaning. Medicated with Morphine subcutaneus  for pain and Haldol subcutaneous  for agitation. Repositioned for comfort. 2994- The patient is now resting quietly in the bed with no signs of distress observed. Face is relaxed with no grimace present. 1130- Patient took two bites of potatoes and gravy and 5 ml of cranberry juice. Patient choked on the food and drink and started coughing. 1148- Medicated with Morphine subcutaneous for pain and dyspnea and with Haldol subcutaneous for agitation. Educated the family at the bedside that the patient screaming out like he was doing and the grimace on his face made me make the decision to medicate the patient. The family wanted more information. Informed then that they would need the patient's code for me to tell them anymore. The family voiced understanding. 1210- The patient is resting quietly with no signs of any distress. Respirations are even and unlabored. 1409- The patient is coughing with no production. Patient is gurgling with troublesome secretions. Medicated IM with Lorazepam of agitation, Morphine subcutaneous for pain and dyspnea and Glycopyrrolate for the secretions. Suctioned deep with 12 F catheter and removed large amount of thick white secretions. Tylenol suppository given for temperature of 100.6 axillary. Repositioned the patient for comfort. 1500- Spoke with the patient's son, Lonnie Ramírez and updated on the patients condition.  The son voiced understanding. 1430- The patient is now resting quietly in the bed with no signs of distress observed. 1800- The patient is groaning and grimacing. Medicated with Morphine for pain. Medicated with scheduled Haldol. Medications were administered subcutaneous. Repositioned the patient for comfort. 1830- The patient is resting quietly in the bed and shows no signs of distress. Will report off to the on coming RN and complete walking rounds.

## 2019-08-15 NOTE — [facility] IDG MASTER NOTE
[facility]  Date: 08/15/19  Time: 4:38 PM    ___________________    Patient: Dorothy Barnes  Coverage Information:     Payor: [place]     Plan: [place] MED[place]ARE PART A AND B     Subscriber ID: [id number]     Phone Number:   MRN: [id number]    Current Benefit Period: Benefit Period 1  Start Date: 8/14/2019  End Date: 11/11/2019      Medical Director: Gladys Wilson MD   Hospice Attending Provider: No Hospice attending provider. Level of Care: General Inpatient Care      ___________________    Diagnoses:  Diagnoses of Acute respiratory failure with hypoxia ([address] Healthcare-associated pneumonia, and Late onset Alzheimer's disease with behavioral disturbance were pertinent to this visit.     Current Medications:    Current Facility-Administered Medications:     morphine injection 4 mg, 4 mg, IntraVENous, Q1H PRN **OR** morphine injection 4 mg, 4 mg, SubCUTAneous, Q1H PRN, Es Cooper MD, 4 mg at 08/15/19 1408    fentaNYL (DURAGESIC) 12 mcg/hr patch 1 Patch, 1 Patch, TransDERmal, Q72H, GoldThor MD, 1 Patch at 08/15/19 1140    acetaminophen (TYLENOL) suppository 650 mg, 650 mg, Rectal, Q3H PRN, Es Cooper MD, 650 mg at 08/15/19 1408    bisacodyl (DULCOLAX) suppository 10 mg, 10 mg, Rectal, PRN, Edrie Mom, NP    haloperidol lactate (HALDOL) injection 2 mg, 2 mg, SubCUTAneous, Q1H PRN, 2 mg at 08/15/19 1142 **OR** haloperidol lactate (HALDOL) injection 2 mg, 2 mg, IntraVENous, Q1H PRN, Edrie Mom, NP    glycopyrrolate (ROBINUL) injection 0.2 mg, 0.2 mg, SubCUTAneous, Q4H PRN, Edrie Mom, NP, 0.2 mg at 08/15/19 1409    haloperidol lactate (HALDOL) injection 2 mg, 2 mg, SubCUTAneous, PCD, Edrie Mom, NP, 2 mg at 08/14/19 1714    LORazepam (ATIVAN) injection 1 mg, 1 mg, IntraMUSCular, Q2H PRN, Mary Mom, NP, 1 mg at 08/15/19 1409    Orders:  Orders Placed This Encounter    IP CONSULT TO SPIRITUAL CARE     Standing Status:   Standing Number of Occurrences:   1     Order Specific Question:   Reason for Consult: Answer: Once on week one, then PRN. For [facility] Only. For contracted patients, their primary hospice will continue to manage spiritual care needs.  DIET PLEASURE     Standing Status:   Standing     Number of Occurrences:   1    NURSING-MISCELLANEOUS: Comfort Care Measures CONTINUOUS     Standing Status:   Standing     Number of Occurrences:   1     Order Specific Question:   Description of Order:     Answer:   Comfort Care Measures    CINTRON CATHETER, CARE     1. Cintron Catheter care every shift and PRN  2. Notify Physician of Cintron Catheter leakage, occlusion, gross adherent sediment or accidental removal  3. Change Cintron 30 days after insertion. Standing Status:   Standing     Number of Occurrences:   1    BLADDER CHECKS     May scan bladder PRN for urinary retention and or patient discomfort     Standing Status:   Standing     Number of Occurrences:   1    PAIN ASSESSMENT Pain and Symptoms: Assess ever 4 hours and PRN, for GIP level of care. PRN Routine     Standing Status:   Standing     Number of Occurrences:   1     Order Specific Question:   Please describe the test or procedure you would like to order. Answer:   Pain and Symptoms: Assess ever 4 hours and PRN, for GIP level of care.  BEDREST, COMPLETE     Standing Status:   Standing     Number of Occurrences:   1    NURSING-MISCELLANEOUS: Clinical Summary: 62-year-old male living at home with moderately advanced Alzheimer's disease who was hospitalized in July for pneumonia and respiratory failure. He was discharged to a local rehabilitation facility but sev. .. 62-year-old male living at home with moderately advanced Alzheimer's disease who was hospitalized in July for pneumonia and respiratory failure.   He was discharged to a local rehabilitation facility but several days ago developed increasing shortness of breath and respiratory distress requiring hospitalization and invasive ventilatory assistance. He has been gradually weaned to moderate flow oxygen but is still significantly short of breath complicated by increasing confusion and decreasing oral intake. He has been requiring parenteral medications for comfort measures. His feeding tube has been removed. He has been transferred out of ICU. He is a DNR. The patient's family desires no additional aggressive diagnostic or therapeutic interventions and the attending staff feels that these would be of no benefit. Standing Status:   Standing     Number of Occurrences:   1     Order Specific Question:   Description of Order:     Answer:   Clinical Summary:    VITAL SIGNS     Standing Status:   Standing     Number of Occurrences:   1    NURSING ASSESSMENT:  SPECIFY Assess for GIP, routine, or respite level of care. Q SHIFT Routine     Standing Status:   Standing     Number of Occurrences:   1     Order Specific Question:   Please describe the test or procedure you would like to order. Answer:   Assess for GIP, routine, or respite level of care.  VITAL SIGNS     Standing Status:   Standing     Number of Occurrences:   1    WOUND CARE, DRESSING CHANGE     Wound Care:  Location: sacrum  Decubitus Wound Stage II ([place]avalon)- Cleanse wound location with wound cleanser, pat dry and apply Cavilon Durable Barrier Cream every 12 hours and PRN if soiled. Turn every 2 hours. Assess with each nursing assessment visit. Standing Status:   Standing     Number of Occurrences:   30    WOUND CARE, DRESSING CHANGE     Wound Care:  Location: bridge of nose  Abrasion: Please leave open to air. Notify provider if wound develops drainage. Standing Status:   Standing     Number of Occurrences:   1    DO NOT RESUSCITATE     Standing Status:   Standing     Number of Occurrences:   1     Order Specific Question:   Comfort Measures Only? Answer:    Yes    OXYGEN CANNULA Liters per minute: 2; Indications for O2 therapy: RESPIRATORY DISTRESS PRN Routine     Standing Status:   Standing     Number of Occurrences:   1     Order Specific Question:   Liters per minute: Answer:   2     Order Specific Question:   Indications for O2 therapy     Answer:   RESPIRATORY DISTRESS    DISCONTD: sodium chloride (NS) flush 3 mL    DISCONTD: morphine (ROXANOL) concentrated oral syringe 10 mg    DISCONTD: morphine (ROXANOL) concentrated oral syringe 10 mg    DISCONTD: morphine injection 2 mg    DISCONTD: morphine injection 2 mg    DISCONTD: acetaminophen (TYLENOL) tablet 650 mg    acetaminophen (TYLENOL) suppository 650 mg    DISCONTD: loperamide (IMODIUM) capsule 4 mg    DISCONTD: senna (SENOKOT) tablet 8.6 mg    DISCONTD: haloperidol lactate (HALDOL) injection 2 mg    DISCONTD: haloperidol lactate (HALDOL) injection 2 mg    DISCONTD: hyoscyamine SL (LEVSIN/SL) tablet 0.125 mg    DISCONTD: glycopyrrolate (ROBINUL) injection 0.2 mg    DISCONTD: LORazepam (ATIVAN) injection 2 mg    bisacodyl (DULCOLAX) suppository 10 mg    DISCONTD: glycopyrrolate (ROBINUL) injection 0.2 mg    DISCONTD: LORazepam (ATIVAN) injection 0.5 mg    OR Linked Order Group     haloperidol lactate (HALDOL) injection 2 mg     haloperidol lactate (HALDOL) injection 2 mg    DISCONTD: morphine injection 2 mg    DISCONTD: morphine injection 2 mg    acetaminophen (TYLENOL) 325 mg tablet     Sig: Take 650 mg by mouth every eight (8) hours.  busPIRone (BUSPAR) 5 mg tablet     Sig: Take 5 mg by mouth two (2) times a day.  divalproex DR (DEPAKOTE) 250 mg tablet     Sig: Take 750 mg by mouth nightly.  divalproex DR (DEPAKOTE) 500 mg tablet     Sig: Take 500 mg by mouth two (2) times daily (with meals).  haloperidol (HALDOL) 2 mg/mL oral concentrate     Sig: Take 4 mg by mouth two (2) times a day.  levalbuterol (XOPENEX) 1.25 mg/0.5 mL nebu     Si.25 mg by Nebulization route every four (4) hours as needed (wheezing).  olopatadine (PATADAY) 0.2 % drop ophthalmic solution     Sig: Administer 1 Drop to both eyes daily.  polyethylene glycol (MIRALAX) 17 gram packet     Sig: Take 17 g by mouth daily as needed.  senna (SENNA) 8.6 mg tablet     Sig: Take 2 Tabs by mouth daily.  glycopyrrolate (ROBINUL) injection 0.2 mg    DISCONTD: LORazepam (ATIVAN) injection 0.5 mg    haloperidol lactate (HALDOL) injection 2 mg    LORazepam (ATIVAN) injection 1 mg    OR Linked Order Group     morphine injection 4 mg     morphine injection 4 mg    fentaNYL (DURAGESIC) 12 mcg/hr patch 1 Patch    INITIAL PHYSICIAN ORDER: HOSPICE Level Of Care: General Inpatient; Reason for Admission: hypoxic respiratory failure and dementia for  respiratory distress and delirium     Standing Status:   Standing     Number of Occurrences:   1     Order Specific Question:   Status     Answer:   Hospice     Order Specific Question:   Level Of Care     Answer:   General Inpatient     Order Specific Question:   Reason for Admission     Answer:   hypoxic respiratory failure and dementia for  respiratory distress and delirium     Order Specific Question:   Inpatient Hospitalization Certified Necessary for the Following Reasons     Answer:   3. Patient receiving treatment that can only be provided in an inpatient setting (further clarification in H&P documentation)     Order Specific Question:   Admitting Diagnosis     Answer:   Respiratory failure with hypoxia [facility]) [[id number]]     Order Specific Question:   Terminal Prognosis Diagnosis(es)     Answer:   Respiratory failure with hypoxia [facility]) [[id number]]     Order Specific Question:   Admitting Physician     Answer:   Ashley Schulte     Order Specific Question:   Attending Physician     Answer:   He Hickman [[id number]]    IP CONSULT TO SOCIAL WORK     Standing Status:   Standing     Number of Occurrences:   1     Order Specific Question:   Reason for Consult: Answer:    For [facility] Only. For contracted patients, their primary hospice will continue to manage social work needs. Allergies: Allergies   Allergen Reactions    Prednisone Itching and Anxiety     Insomnia. Nervousness.  Fd And C No.5 (Tartrazine) Unknown (comments)    Iodinated Contrast Media Unknown (comments)    Prednisolone Unknown (comments)       Care Plan:   Problem: Anticipatory Grief     Dates: Start: 08/14/19       Description:     Disciplines: Interdisciplinary    Goal: Explore reactions to and verbalize acceptance of impending loss     Dates: Start: 08/14/19   Expected End: 08/19/19       Description: Patient/family/caregiver will explore reactions to and verbalize acceptance of impending loss. Disciplines: Interdisciplinary    Intervention: Assess grief responses     Dates: Start: 08/14/19       Description:           Intervention: Assist with grief counseling     Dates: Start: 08/14/19       Description:  to assist.          Intervention: Ferrel Jeans on stages of grief     Dates: Start: 08/14/19       Description: Instruct patient/caregiver on stages of grief. Intervention: Offer grief support group     Dates: Start: 08/14/19       Description: Patient/family/caregiver will explore reactions to and verbalize acceptance of impending loss. Problem: Anxiety/Agitation     Dates: Start: 08/14/19       Description:     Disciplines: Interdisciplinary    Goal: Verbalize and demonstrate ability to manage anxiety     Dates: Start: 08/14/19   Expected End: 08/21/19       Description: The patient/family/caregiver will verbalize and demonstrate ability to manage the patient's anxiety throughout hospice care. Disciplines: Interdisciplinary    Intervention: Assess for anxiety/agitation     Dates: Start: 08/14/19       Description: Assess for signs and symptoms of anxiety and agitation.           Intervention: Instruction strategies to reduce anxiety/agitation     Dates: Start: 08/14/19       Description: Instruct patient/caregiver on strategies to reduce anxiety/agitation. Problem: Coping and Emotional Distress     Dates: Start: 08/14/19       Description:     Disciplines: Interdisciplinary    Goal: Demonstrate acceptance of terminal illness and understanding of disease progression     Dates: Start: 08/14/19   Expected End: 08/19/19       Description: Patient/family/caregiver will demonstrate acceptance of terminal disease and understanding of disease progression while employing appropriate coping mechanisms. Disciplines: Interdisciplinary    Intervention: Assess for alteration in coping     Dates: Start: 08/14/19       Description:           Intervention: Assess for signs/symptoms of emotional distress     Dates: Start: 08/14/19       Description:           Intervention: Instruct on effective coping skills     Dates: Start: 08/14/19       Description: Instruct patient/caregiver on effective coping skills. Intervention: Instruct on strategies to reduce emotional distress     Dates: Start: 08/14/19       Description: Instruct patient/caregiver on strategies to reduce emotional distress.                       Problem: Dyspnea Due to End of Life     Dates: Start: 08/14/19       Description:     Disciplines: Interdisciplinary    Goal: Demonstrate understanding of and ability to manage respiratory symptoms at end of life     Dates: Start: 08/14/19   Expected End: 08/21/19       Description:     Disciplines: Interdisciplinary    Intervention: Assess for signs and symptoms of dyspnea     Dates: Start: 08/14/19       Description:           Intervention: Instruct on causes/symptoms of dyspnea     Dates: Start: 08/14/19       Description:           Intervention: James Taylor patient/caregiver/family on strategies to effectively manage dyspnea     Dates: Start: 08/14/19       Description:                       Problem: Emotional Support Needs     Dates: Start: 08/14/19 Disciplines: Interdisciplinary    Goal: Patient/family is receiving emotional support     Dates: Start: 19   Expected End: 19       Description: Family will feel well supported while here at [facility]     Disciplines: Interdisciplinary    Intervention: Assess Emotional Status     Dates: Start: 19             Intervention: Encourage sharing of feelings     Dates: Start: 19                         Problem: Falls - Risk of     Dates: Start: 19       Description:     Disciplines: Interdisciplinary    Goal: *Absence of Falls     Dates: Start: 19   Expected End: 19       Description: Document Candida Lightning Fall Risk and appropriate interventions in the flowsheet.     Disciplines: Interdisciplinary                Problem: Grieving     Dates: Start: 19       Description:     Disciplines: Interdisciplinary    Goal: *Able to identify stages of grieving process     Dates: Start: 19       Description:     Disciplines: Interdisciplinary    Intervention: Assist patient and family to decide about autopsy,  plans, completing important life tasks, coping with impending death, engaging in meaningful rituals, providing care of the body after death     Dates: Start: 19       Description: Family will be given community resources to use to decide on a local  for final arrangements  Family will feel encouraged to make phone calls to inquire about pricing for open and closing of the grave, [place]   Family will make a decision on cremation vs burial or cremation with a burial.                        Problem: Pain     Dates: Start: 19       Description:     Disciplines: Interdisciplinary    Goal: Verbalize satisfaction of level of comfort and symptom control     Dates: Start: 19   Expected End: 19       Description:     Disciplines: Interdisciplinary    Intervention: Assess effectiveness of pain management     Dates: Start: 19       Description: Intervention: Assess for signs/symptoms of acute pain     Dates: Start: 08/14/19       Description:           Intervention: Assess for signs/symptoms of chronic pain     Dates: Start: 08/14/19       Description:           Intervention: Instruct on non-pharmacological pain management     Dates: Start: 08/14/19       Description:           Intervention: Instruct on pain scales     Dates: Start: 08/14/19       Description:           Intervention: Instruct on pharmacological pain management     Dates: Start: 08/14/19       Description:                       Problem: Pressure Injury - Risk of     Dates: Start: 08/14/19       Description:     Disciplines: Interdisciplinary    Goal: *Prevention of pressure injury     Dates: Start: 08/14/19   Expected End: 08/21/19       Description: Document Raul Scale and appropriate interventions in the flowsheet. Disciplines: Interdisciplinary                Problem: Spiritual Distress     Dates: Start: 08/14/19       Description:     Disciplines: Interdisciplinary    Goal: Distress heard, acknowledged, and addressed     Dates: Start: 08/14/19   Expected End: 08/19/19       Description: Patient/family/caregiver distress will be heard, acknowledged, and addressed throughout hospice care. Disciplines: Interdisciplinary    Intervention: Assess for signs/symptoms of spiritual distress     Dates: Start: 08/14/19       Description:           Intervention: Consult on spiritual care     Dates: Start: 08/14/19       Description: Consult to Spiritual Care          Intervention: Discuss strategies to reduce spiritual distress     Dates: Start: 08/14/19       Description: Discuss with patient/caregiver strategies to reduce spiritual distress.                         Care Plan Problems/Goals      Progressing Towards Goal (7)     *Prevention of pressure injury     Problem: Pressure Injury - Risk of    Disciplines: Interdisciplinary    Expected end:  08/21/19     Progressing Towards Goal By Huber Henao RN on 08/14/19 1553               *Absence of Falls     Problem: Falls - Risk of    Disciplines: Interdisciplinary    Expected end:  08/21/19     Progressing Towards Goal By Huber Henao RN on 08/14/19 1553               Explore reactions to and verbalize acceptance of impending loss     Problem: Anticipatory Grief    Disciplines: Interdisciplinary    Expected end:  08/19/19     Progressing Towards Goal By Huebr Henao RN on 08/14/19 1553               Verbalize and demonstrate ability to manage anxiety     Problem: Anxiety/Agitation    Disciplines: Interdisciplinary    Expected end:  08/21/19     Progressing Towards Goal By Huber Henao RN on 08/14/19 1553               Demonstrate acceptance of terminal illness and understanding of disease progression     Problem: Coping and Emotional Distress    Disciplines: Interdisciplinary    Expected end:  08/19/19     Progressing Towards Goal By Huber Henao RN on 08/14/19 1553               Distress heard, acknowledged, and addressed     Problem: Spiritual Distress    Disciplines: Interdisciplinary    Expected end:  08/19/19     Progressing Towards Goal By Huber Henao RN on 08/14/19 1553               Demonstrate understanding of and ability to manage respiratory symptoms at end of life     Problem: Dyspnea Due to End of Life    Disciplines: Interdisciplinary    Expected end:  08/21/19     Progressing Towards Goal By Huber Henao RN on 08/14/19 1553                      No Outcome (3)     *Able to identify stages of grieving process     Problem: Grieving    Disciplines:     Expected end:  -                 Patient/family is receiving emotional support     Problem: Emotional Support Needs    Disciplines:     Expected end:  08/19/19                 Verbalize satisfaction of level of comfort and symptom control     Problem: Pain    Disciplines:     Expected end:  08/21/19 ___________________    Care Team Notes          POC/IDG Notes      [facility] IDG Volunteer Notes by Marilyn Israel at 08/15/19 1155  Version 1 of 1    Author:  Marilyn Israel Service:  Emma Alcaraz Author Type:  Hospice Volunteer/    Filed:  08/15/19 1156 Date of Service:  08/15/19 1155 Status:  Signed    :  Marilyn Israel (Hospice Volunteer/)       Patient: Stephanie Healy    Date: 08/15/19  Time: 11:55 AM    [facility] Volunteer Notes  VC has reviewed the initial RN Comprehensive assessment and the plan of care. VC is in agreement with the plan of care. Pt will receive general support by volunteers as evidenced by comfort bag delivery, snack cart, supplies to the room, companionship visits, pet therapy and/or therapeutic music. Signed by: Marianna Calle       [facility]  Notes by Yvrose Menjivar at 08/15/19 1055  Version 1 of 1    Author:  Yvrose Menjivar Service:  Spiritual Care Author Type:  Pastoral Care    Filed:  08/15/19 1056 Date of Service:  08/15/19 1055 Status:  Signed    :  Yvrose Menjivar (Pastoral Care)       Patient: Stephanie Healy    Date: 08/15/19  Time: 10:55 AM    [facility]  Notes   has reviewed  Initial Comprehensive Assessment and Initial Plan of Care for [place] Rita.  is in agreement with the plans put in place and goals set thus far.  will be making a Spiritual and bereavement Assessment to determine needs that can be supported through [facility]. Signed by: Lianna GOOD [facility] IDG Other Notes by Jacklyn Cardenas at 08/15/19 1028  Version 1 of 1    Author:  Jacklyn Cardenas Service:  HOSPICE Author Type:  Pastoral Care    Filed:  08/15/19 1032 Date of Service:  08/15/19 1028 Status:  Signed    :  Jacklyn Cardenas (Pastoral Care)       Bereavement Coordinator has reviewed and agrees with RN Initial Comprehensive Assessment and Care Plan.   I acknowledge no urgent Bereavement Care needs identified at time of admission. [facility] IDG  Notes by Kishan Vazquez at 08/14/19 1640  Version 1 of 1    Author:  Kishan Vazquez Service:  Licensed Clinical  Author Type:      Filed:  08/14/19 1641 Date of Service:  08/14/19 1640 Status:  Signed    :  Kishan Vazquez ()       Patient: Kailyn Elizabeth    Date: 08/14/19  Time: 4:40 PM    [facility]  Notes  LMSW has reviewed the initial Rn Comprehensive assessment and plan of care. Acknowledge there is no immediate needs for SW. Signed by: Vandana Rg       [facility] IDG  Notes by Huber Henao RN at 08/14/19 1559  Version 1 of 1    Author:  Huber Henao RN Service:  -- Author Type:  Registered Nurse    Filed:  08/14/19 1559 Date of Service:  08/14/19 1559 Status:  Signed    :  Huber Henao RN (Registered Nurse)       Patient: Kailyn Elizabeth    Date: 08/14/19  Time: 10:48 AM    [facility]  Notes  70year old man patient admitted to room 116 with hospice diagnosis of hypoxic respiratory failure. Level of care is general inpatient for management of dyspnea and agitation. Upon arrival patient is alert with his eyes open but he does not respond to RN's questions. He does make eye contact and tracks his son around the room. FLACC score is 0/10 but patient is very stiff when turned. Patient is on 2 liters of oxygen via nasal cannula. He has a loose distressing cough, he did have to be suctioned orally in the hospital.  He has had recurrent aspiration pneumonia most recently resulting in admission to ICU at [facility] on 8/3/19. He was on nasogastric tube feeding until yesterday at which point his sons decided to pursue comfort care for him as providers recommended against PEG tube placement. PO intake has not been attempted since NG tube was removed.   Patient required morphine IV and ativan IV for pain, dyspnea, and agitation in the hospital.  His peripheral IV was infiltrated upon arrival to [facility], removed by this RN. 1200 Gave glycopyrrolate 0.2 mg SC for secretions. 1207  Gave morphine 2 mg SC for cough and as premed for suctioning. Suctioned patient orally with [employer]. He tries to bite down on it but RN was able to obtain moderate amount of thick white/tan mucous. After suctioning he is resting more quietly, only coughed once more and then breathing is regular and unlabored. Son Kelli Aldana is torn between giving his dad medication and wanting him to be awake. Patient has had a steep decline since a fall in February (6 months ago) at which point he was living at home alone. He has been in a nursing home since then. He is a DNR and purchased a cemetery plot years ago but no arrangements have been made with a  home. Admission complete and initial general hospice care plan initiated which includes spiritual, psychosocial, and bereavement.  has already seen patient and son for initial assessment. No urgent spiritual or bereavement needs identified on admission by RN. IDG team made aware of plan of care and immediate needs.     Signed by: Charles Rand RN                Care Team Present:   Team Members Present: Physician, , Bereavement, Nurse, Vol Coordinator,   Physician Team Member: Ja Carter MD  Nurse Team Member: Loraine Bustmaante RN  Social Work Team Member: Rebekah Whitmore [facility]  [address] Team Member: Christopher Engle   Vol Coordinator: Mirtha Dumont

## 2019-08-15 NOTE — H&P
History and Physical    Patient: Ventura Brwon MRN: [id number]  SSN: [id number]    YOB: 1940  Age: 70 y.o. Sex: male      Subjective: Ventura Brown is a 70 y.o. male who living at home with moderately advanced Alzheimer's disease. He was hospitalized in July for pneumonia and respiratory failure. Edmar Durand was discharged to a local rehabilitation facility but several days ago developed increasing shortness of breath and respiratory distress requiring hospitalization and invasive ventilatory assistance. Edmar Durand has been gradually weaned to moderate flow oxygen but is still significantly short of breath complicated by increasing confusion and decreasing oral intake.  He has been requiring parenteral medications for comfort measures.  His feeding tube has been removed.  He has been transferred out of ICU. Edmar Durand is a DNR.  The patient's family desires no additional aggressive diagnostic or therapeutic interventions and the attending staff feels that these would be of no benefit.      Past Medical History:   Diagnosis Date    Anemia     Aneurysm of internal iliac artery (HCC)     Anxiety disorder     AR (allergic rhinitis)     CAD (coronary artery disease)     Cancer of skin 2014    malignant cells removed from right eye    Chronic pain     Dementia     Diarrhea     chronic, post colectomy    Diverticulitis     possible    ED (erectile dysfunction)     Esophagitis     H/O colectomy     [place] (hard of hearing)     Hypertension     IBS (irritable bowel syndrome)     IBS (irritable bowel syndrome) 1/14/2015    Kidney stone     Mini stroke ([address]     Osteoarthritis     Peripheral neuropathy     Peripheral vascular disease ([address]     Scalp laceration     Skin callus     squamous cell    Umbilical hernia      Past Surgical History:   Procedure Laterality Date    HX COLECTOMY      had colostomy for 3 months and then reversed    HX CORONARY ARTERY BYPASS GRAFT N/A 1987    HX HERNIA REPAIR  2017    HX MALIGNANT SKIN LESION EXCISION Left 2015    skin cancer removed from left eye with eyelid reconstruction.  HX OPEN CHOLECYSTECTOMY      HX OTHER SURGICAL      Right internal iliac artery aneurysm sp coiling and plugging in      HX OTHER SURGICAL      cardiac surgery    HX OTHER SURGICAL  2015    eye surgery-retraction repair    HX OTHER SURGICAL  2015    2nd stage lid reconstruction    HX OTHER SURGICAL  2005    sinus surgery      Family History   Problem Relation Age of Onset    Heart Disease Sister     Heart Disease Brother     Stroke Other     Hypertension Other      Social History     Tobacco Use    Smoking status: Former Smoker     Last attempt to quit:      Years since quittin.6    Smokeless tobacco: Never Used   Substance Use Topics    Alcohol use: No      Prior to Admission medications    Medication Sig Start Date End Date Taking? Authorizing Provider   acetaminophen (TYLENOL) 325 mg tablet Take 650 mg by mouth every eight (8) hours. Yes Provider, Historical   busPIRone (BUSPAR) 5 mg tablet Take 5 mg by mouth two (2) times a day. Yes Provider, Historical   divalproex DR (DEPAKOTE) 250 mg tablet Take 750 mg by mouth nightly. Yes Provider, Historical   divalproex DR (DEPAKOTE) 500 mg tablet Take 500 mg by mouth two (2) times daily (with meals). Yes Provider, Historical   haloperidol (HALDOL) 2 mg/mL oral concentrate Take 4 mg by mouth two (2) times a day. Yes Provider, Historical   levalbuterol (XOPENEX) 1.25 mg/0.5 mL nebu 1.25 mg by Nebulization route every four (4) hours as needed (wheezing). Yes Provider, Historical   olopatadine (PATADAY) 0.2 % drop ophthalmic solution Administer 1 Drop to both eyes daily. Yes Provider, Historical   polyethylene glycol (MIRALAX) 17 gram packet Take 17 g by mouth daily as needed. Yes Provider, Historical   senna (SENNA) 8.6 mg tablet Take 2 Tabs by mouth daily.    Yes Provider, Historical        Allergies   Allergen Reactions    Prednisone Itching and Anxiety     Insomnia. Nervousness.  Fd And C No.5 (Tartrazine) Unknown (comments)    Iodinated Contrast Media Unknown (comments)    Prednisolone Unknown (comments)       Review of Systems: The remainder of the admission historical database is as outlined in the Clinical Record. Objective:     Vitals:    08/14/19 1100 08/14/19 1611 08/15/19 0628   BP: 147/82  93/50   Pulse: 78  63   Resp: 16  14   Temp: 99.4 °F (37.4 °C)  97.5 °F (36.4 °C)   Weight:  66.7 kg (147 lb)    Height:  5' 10\" (1.778 m)         Physical Exam:    Vital signs as outlined. Skin examination reveals senile changes only. Head and neck examination revealed no adenopathy or jaundice. Cardiopulmonary examination reveals scattered rhonchi with no signs respiratory distress and a regular rhythm with no diastolic murmur or rub. Abdominal examination reveals no palpable masses or tenderness. Extremities reveal no joint deformity or signs of trauma. Peripheral vascular examination reveals no edema, diminished peripheral pulses without peripheral ischemic changes. Neurologic examination reveals patient to be unresponsive to gentle tactile and verbal stimuli with no clear-cut lateralizing abnormalities and no seizure activity. Assessment:     Hospital Problems  Date Reviewed: 8/14/2019          Codes Class Noted POA    * (Principal) Respiratory failure with hypoxia [facility]) ICD-10-CM: J96.91  ICD-9-CM: 518.81  8/13/2019 Unknown        Healthcare-associated pneumonia ICD-10-CM: J18.9  ICD-9-CM: 038  8/13/2019 Unknown        Late onset Alzheimer's disease with behavioral disturbance ICD-10-CM: G30.1, F02.81  ICD-9-CM: 331.0, 294.11  11/6/2017 Unknown    Overview Signed 8/14/2019  2:59 PM by Vivian Cordova NP     Overview:   getting forgetful, unsteady gait    Last Assessment & Plan:   He has had severe sundowning in the past per son.  I will leave his current HS Seroquel in place but will stop the morning dose. Will try to avoid disruptions to sleep wake cycle, avoid benzodiazepines and anticholinergics as well as narcotics to the degree possible. Plan:     Current Facility-Administered Medications   Medication Dose Route Frequency    morphine injection 4 mg  4 mg IntraVENous Q1H PRN    Or    morphine injection 4 mg  4 mg SubCUTAneous Q1H PRN    fentaNYL (DURAGESIC) 12 mcg/hr patch 1 Patch  1 Patch TransDERmal Q72H    acetaminophen (TYLENOL) suppository 650 mg  650 mg Rectal Q3H PRN    bisacodyl (DULCOLAX) suppository 10 mg  10 mg Rectal PRN    haloperidol lactate (HALDOL) injection 2 mg  2 mg SubCUTAneous Q1H PRN    Or    haloperidol lactate (HALDOL) injection 2 mg  2 mg IntraVENous Q1H PRN    glycopyrrolate (ROBINUL) injection 0.2 mg  0.2 mg SubCUTAneous Q4H PRN    haloperidol lactate (HALDOL) injection 2 mg  2 mg SubCUTAneous PCD    LORazepam (ATIVAN) injection 1 mg  1 mg IntraMUSCular Q2H PRN       He is stable and sedated at present time. There are no signs of active respiratory distress. We will continue his current medication and observation to get a better sense of what his clinical trajectory might be. I would anticipate death within the next week or so unless there is a major improvement.       Signed By: Macie Arnett MD     August 15, 2019

## 2019-08-15 NOTE — PROGRESS NOTES
Progress Note    Patient: Kailyn Elizabeth MRN: [id number]  SSN: [id number]    YOB: 1940  Age: 70 y.o. Sex: male      Admit Date: 8/14/2019    LOS: 1 day     Clinical Summary:     Weren't has received numerous injections of MORPHINE and HALOPERIDOL and is now unresponsive. His respirations are slow and shallow with not much congestion and no respiratory distress. Vitals:    08/14/19 1100 08/14/19 1611 08/15/19 0628   BP: 147/82  93/50   Pulse: 78  63   Resp: 16  14   Temp: 99.4 °F (37.4 °C)  97.5 °F (36.4 °C)   Weight:  66.7 kg (147 lb)    Height:  5' 10\" (1.778 m)             Clinical Assessment:     Principal Problem:    Respiratory failure with hypoxia (HCC) (8/13/2019)    Active Problems:    Healthcare-associated pneumonia (8/13/2019)      Late onset Alzheimer's disease with behavioral disturbance (11/6/2017)      Overview: Overview:       getting forgetful, unsteady gait            Last Assessment & Plan:       He has had severe sundowning in the past per son. I will leave his current       HS Seroquel in place but will stop the morning dose. Will try to avoid       disruptions to sleep wake cycle, avoid benzodiazepines and       anticholinergics as well as narcotics to the degree possible. Treatment Plan:      I have reviewed the patient's Plan of Care with the nursing staff. Multiple medication adjustments as outlined in the orders. Death is likely this week.           Current Facility-Administered Medications   Medication Dose Route Frequency    morphine injection 4 mg  4 mg IntraVENous Q1H PRN    Or    morphine injection 4 mg  4 mg SubCUTAneous Q1H PRN    fentaNYL (DURAGESIC) 12 mcg/hr patch 1 Patch  1 Patch TransDERmal Q72H    acetaminophen (TYLENOL) suppository 650 mg  650 mg Rectal Q3H PRN    bisacodyl (DULCOLAX) suppository 10 mg  10 mg Rectal PRN    haloperidol lactate (HALDOL) injection 2 mg  2 mg SubCUTAneous Q1H PRN    Or    haloperidol lactate (HALDOL) injection 2 mg  2 mg IntraVENous Q1H PRN    glycopyrrolate (ROBINUL) injection 0.2 mg  0.2 mg SubCUTAneous Q4H PRN    haloperidol lactate (HALDOL) injection 2 mg  2 mg SubCUTAneous PCD    LORazepam (ATIVAN) injection 1 mg  1 mg IntraMUSCular Q2H PRN           Signed By: Jarrett Vásquez MD     August 15, 2019

## 2019-08-15 NOTE — PROGRESS NOTES
0- Report received from Jonathon Jiménez, [facility]. Pt rounding made. Jonathon Jiménez is giving patient prn medication for anxiety at this time. Will leave door open for continuous monitoring. Bed is low and locked and tab alert is in place. 2010- premedicated patient prn prn morphine for bath. Allowed patient to attempt to drink through a straw. Unsuccessful. Used a syringe to give patient apple juice. Did not tolerate well. Pt tolerated spoonfuls of pudding and thickened apple juice with minimal coughing. Patient said \"that's enough\" to let this RN know he was finished. 2034- prn morphine given for cough. 2120- flacc 0, patient resting quietly. 0798- prn haldol given for agitation, morphine for cough and dyspnea, and robinul for secretions. Used oral yaunker, minimal results of clear thin secretions and some brown pudding

## 2019-08-16 NOTE — PROGRESS NOTES
Progress Note    Patient: Yasemin Aaron MRN: [id number]  SSN: [id number]    YOB: 1940  Age: 70 y.o. Sex: male      Admit Date: 8/14/2019    LOS: 2 days     Subjective:     Unresponsive. NPO. Taking all meds parenterally, see MAR. No family at bedside. Review of Systems:  Review of systems not obtained due to patient factors. Objective:     Vitals:    08/15/19 0628 08/15/19 1359 08/15/19 1859 08/16/19 0333   BP: 93/50 98/62  109/61   Pulse: 63 89  97   Resp: 14 14  14   Temp: 97.5 °F (36.4 °C) (!) 100.6 °F (38.1 °C) (!) 101.1 °F (38.4 °C) 99.2 °F (37.3 °C)   Weight:       Height:            Intake and Output:  Current Shift: No intake/output data recorded. Last three shifts: 08/14 1901 - 08/16 0700  In: 10 [P.O.:10]  Out: 950 [Urine:950]    Physical Exam:   GENERAL: unresponsive, moderate distress, appears older than stated age, cachectic  LUNG: Coarse breath sounds with rhonchi. Labored respirations. HEART: regular rate and rhythm  ABDOMEN: soft, non-tender. Bowel sounds hypoactive. : Tony catheter with cloudy maxime urine. Inserted 8/6/19. EXTREMITIES: mild upper ext. Edema. Weak pulses. SKIN: Stage 2 to sacrum. Abrasion to the bridge of the nose. Mottling to right foot. skin is cool to touch. NEUROLOGIC: unresponsive. Bedbound. Unable to follow commands. Lab/Data Review:  No new labs resulted in the last 24 hours.     Assessment:     Principal Problem:    Respiratory failure with hypoxia ([address] (8/13/2019)    Active Problems:    Healthcare-associated pneumonia (8/13/2019)      Late onset Alzheimer's disease with behavioral disturbance (11/6/2017)          Plan:     Current Facility-Administered Medications   Medication Dose Route Frequency    haloperidol lactate (HALDOL) injection 5 mg  5 mg SubCUTAneous Q12H    LORazepam (ATIVAN) injection 2 mg  2 mg IntraMUSCular Q2H PRN    morphine injection 4 mg  4 mg IntraVENous Q1H PRN    Or    morphine injection 4 mg  4 mg SubCUTAneous Q1H PRN    fentaNYL (DURAGESIC) 12 mcg/hr patch 1 Patch  1 Patch TransDERmal Q72H    acetaminophen (TYLENOL) suppository 650 mg  650 mg Rectal Q3H PRN    bisacodyl (DULCOLAX) suppository 10 mg  10 mg Rectal PRN    haloperidol lactate (HALDOL) injection 2 mg  2 mg SubCUTAneous Q1H PRN    Or    haloperidol lactate (HALDOL) injection 2 mg  2 mg IntraVENous Q1H PRN    glycopyrrolate (ROBINUL) injection 0.2 mg  0.2 mg SubCUTAneous Q4H PRN       Admitted GIP with hypoxic respiratory failure for management of dyspnea, dysphagia and agitation.     1. Dyspnea: Morphine 2mg IV/SQ Q20 minutes as needed. Duonebs q4 prn. Glycopyrrolate 0.2mg q4 prn secretions. Oxygen at 2 L/min prn.     2. Dysphagia: Diet as tolerated. Keep HOB elevated with po intake. Oral suction as needed.     3. Agitation: Haloperidol 2mg IV/SQ daily at 1800 and Q1 hour prn. Lorazepam 1mg IV/IM q4 hours prn if haldol ineffective.     4. Family/Pt Support: Family at bedside during exam. Medications and plan of care discussed with nursing staff and family. Will continue to monitor for symptoms and adjust medications as needed to maintain patient comfort. PPS 10%. Case discussed with Dr. Yazmin Lee and in [facility] today. Increase haldol to 5mg Q12 and ativan to 2mg prn. Hold meal tray.      Signed By: Susie Modi NP     August 16, 2019

## 2019-08-16 NOTE — PROGRESS NOTES
Received bedside report from Destiny Kennedy RN. Pt identified and noted to be resting comfortably in bed. Pt's eyes are closed and she is noted to be on room air. No S&S of agitation, SOB, nausea, or vomiting. No further needs at this time. No family noted at bedside. Bed low and locked, call light within reach, tab alarm system in place. Door remains open for frequent rounding and observation. Fentanyl patch visualized on left chest.       1930- Morphine 4mg given SQ in right leg for pain and agitation during bath. Pt tolerated well. 2015- pt moaning out trying to cough and clear throat, pt is very congested. Nasal suctioned for moderate amount of secretions, resistance met in both nares when trying to suction again. Robinul given SQ for secretions. Family updated. Assessment completed. 2114- Haldol 2mg given SQ in left arm per order. Pt tolerated well. No family at bedside. 2335- pt repositioned on left side. Resting loudly with eyes open. 0134- pt moaning loudly and unable to cough up secretions. Robinul given SQ in right leg for secretions. Morphine given SQ in right arm. Pt repositioned on right side. 0510- Morphine given SQ in right leg for agitation and Robinul given SQ in right arm. Pt tolerated well. VS documented per flow sheet. Tony emptied for 250ml yellow urine. 0600- Haldol 2mg given SQ in right arm for agitation. 8393- Morphine 4mg given SQ in left leg. Ativan 2mg given SQ in right leg. Pt is very restless and congested, but will not allow RN to suction orally. 1211- Report given to Destiny Kennedy RN.

## 2019-08-16 NOTE — PROGRESS NOTES
1928 prn morphine given for cough and dyspnea. Ativan given for anxiety, and robinul given for secretions. Patient received bath at this time and moaning. Patient coughing and gurgling. Deep suctioned patient oral tracheal route and obtained large amount of thick yellow mucous. Tolerated poorly. SAE Wilson at bedside for assistance. 2000 pt Son and family at bedside at this time. Updated family and questions answered. 2025- patient resting quietly. No si/sx of pain, agitation or respiratory distress. 2200- patient restin quietly  0000- patient resting quietly. No coughing heard since bath. 0200- patient remains sleeping and resting comfortably    0328- patient coughing and gurgling. Medicated with prn haldol for agitation and morphine for cough. Deep oral tracheal suctioned at 345. Tolerated poorly. Obtained large amount of thick yellow mucous. Pt turned and repositioned. 4902- patient snoring at this time. Respirations even and unlabored. 6499- medicated with prn morphine for resp distress and haldol for agitation. Suctioned again oral tracheal route and obtained large amount of yellow thick sputum.

## 2019-08-16 NOTE — PROGRESS NOTES
Problem: Pressure Injury - Risk of  Goal: *Prevention of pressure injury  Description  Patient will be free from any skin breakdown while at [facility]  8/16/2019 0616 by Carol Mahan  Outcome: Not Progressing Towards Goal  Note:   Pressure Injury Interventions:  Sensory Interventions: Assess changes in LOC, Avoid rigorous massage over bony prominences, Float heels, Minimize linen layers    Moisture Interventions: Absorbent underpads, Apply protective barrier, creams and emollients, Limit adult briefs, Maintain skin hydration (lotion/cream), Minimize layers, Moisture barrier    Activity Interventions: Assess need for specialty bed, Pressure redistribution bed/mattress(bed type)    Mobility Interventions: Assess need for specialty bed, Float heels, Pressure redistribution bed/mattress (bed type), HOB 30 degrees or less    Nutrition Interventions: Document food/fluid/supplement intake    Friction and Shear Interventions: Apply protective barrier, creams and emollients, Lift sheet, Minimize layers

## 2019-08-16 NOTE — PROGRESS NOTES
The patient shift change report and walking rounds completed with the off going RN, Ricardo Diehl, at 3527. The patient was identified by name and . He is here GIP with hypoxic respiratory failure, asp pneumonia. He was medicated the last shift several times with prn subcutaneous medications for respiratory distress, troublesome secretions and agitation. The medications resolved the symptoms. He is now resting quietly with his eyes closed and no signs of distress. Pain is 0/10 using non verbal scale. Respirations are even and unlabored but shallow. His bed is low and locked and tab alert is in place. His room is close to the nurses station. 46- The patient continues to rest with no signs of distress. Respirations are even and unlabored on 6 L of oxygen via N/C.     1200- The patient was turned and repositioned for comfort. He shows no signs of distress at this time. 1420- The patient was turned and repositioned in the bed for comfort. He continues to show no signs of distress. His respirations remain shallow with 5 second periods of apnea.

## 2019-08-16 NOTE — PROGRESS NOTES
Background:  Mr. Payal Juarez  Is . He and his wife Stephen Zapata have been living in a facility called [address] [place] [address]. Stephen Zapata is in the memory care unit there. They have two sons, Aminta Feng and DREW. Mr Preston Larsen is a [employer] and adheres to the [facility]. When he was able he attended [employer]. He retired from [employer]. Assessment:  Patient appears to be moving toward his celestial departure. He is looking upward. No family present.  provided support today with a caring presence at the bedside, [employer] singing several hymns, sharing Psalms 23 and offering a prayer     Plan:  to contact family. Jerome Christopher will continue to provide spiritual and emotional support throughout patient's time with [facility].

## 2019-08-16 NOTE — [facility] IDG NURSE NOTES
Patient: Mark Hubbard    Date: 08/16/19  Time: 12:44 PM    \[facility]\"" Nurse Notes  Pt remains general in patient for management of pain, agitation and dyspneal.  Tony with 600 UOP in last 24 hours. Morphine 4 mg x 6 doses for pain and dyspnea, Haldol 2 mg x 5 doses for agitation, Ativan 1 mg in last 24 hours. Fentanyl 12 mcg is in place. Haldol 2 mg scheduled q PM.  Haldol 5 mg x 12 hours and , Ativan 2 mg q 2 hours is being added this IDG for increased symptom management. Pt is NPO related to aspiration risk. Stage II to sacrum, abrasion to bridge of nose. Comprehensive plan of care reviewed. IDG and pt./family in agreement with plan of care. The IDG identifies through on-going assessment when a change is needed to the POC; the pt/family will receive care and services necessitated by changes in POC. Medications reviewed by the pharmacist and Medical Director.           Signed by: Clover Scott RN

## 2019-08-16 NOTE — [facility] IDG SOCIAL WORKER NOTES
Patient: Trenton Lares    Date: 19  Time: 11:06 AM    [facility]  Notes  LMSW will continue to provide emotional support to the pt and his family. The pt has a spouse who also lives at [email or web address] she has dementia. No  home has been chosen, resources given.          Signed by: Sandi Mendosa

## 2019-08-16 NOTE — [facility] IDG CHAPLAIN NOTES
Patient: Hina Zamora    Date: 08/16/19  Time: 12:45 PM    [facility]  Notes    Assessment pending for spiritual and bereavement care.         Signed by: Dina Siemens

## 2019-08-16 NOTE — PROGRESS NOTES
LMSW called the son to see if the family had chosen a  home.   He said they would be using [facility] in [facility]

## 2019-08-16 NOTE — PROGRESS NOTES
Problem: Falls - Risk of  Goal: *Absence of Falls  Description  Patient will not have any falls during shift  Problem: Pain  Goal: Verbalize satisfaction of level of comfort and symptom control  Outcome: Progressing Towards Goal     Problem: Anxiety/Agitation  Goal: Verbalize and demonstrate ability to manage anxiety  Description  Patient will appear relaxed aeb no s/sx of moaning groaning or restlessness  Outcome: Progressing Towards Goal     Problem: Dyspnea Due to End of Life  Goal:   RR < 28.  Less gurgling after suctioning  Outcome: Progressing Towards Goal

## 2019-08-17 NOTE — PROGRESS NOTES
Progress Note    Patient: Berhane Torres MRN: [id number]  SSN: [id number]    YOB: 1940  Age: 70 y.o. Sex: male      Admit Date: 8/14/2019    LOS: 3 days     Clinical Summary:     He has required a fair amount of additional parenteral injections for his comfort over the past 24 hours as and AR. There is moderate respiratory congestion but very little signs of distress. The remainder of his initial examination is unchanged. Vitals:    08/16/19 1600 08/16/19 1820 08/17/19 0525 08/17/19 0820   BP: (!) 84/46  142/77 (!) 78/36   Pulse: (!) 104  (!) 125 (!) 116   Resp: 24  15 16   Temp: (!) 100.7 °F (38.2 °C) 99 °F (37.2 °C) 99.2 °F (37.3 °C) 99.4 °F (37.4 °C)   Weight:       Height:                Clinical Assessment:     Principal Problem:    Respiratory failure with hypoxia (HCC) (8/13/2019)    Active Problems:    Healthcare-associated pneumonia (8/13/2019)      Late onset Alzheimer's disease with behavioral disturbance (11/6/2017)      Overview: Overview:       getting forgetful, unsteady gait            Last Assessment & Plan:       He has had severe sundowning in the past per son. I will leave his current       HS Seroquel in place but will stop the morning dose. Will try to avoid       disruptions to sleep wake cycle, avoid benzodiazepines and       anticholinergics as well as narcotics to the degree possible. Treatment Plan:      I have reviewed the patient's Plan of Care with the nursing staff. I have adjusted his medications to reflect an increase in his schedule HALOPERIDOL, increase in his breakthrough MORPHINE dose and increase in his breakthrough haloperidol as well. He appears to be comfortable and I anticipate death within the coming week.           Current Facility-Administered Medications   Medication Dose Route Frequency    fentaNYL (DURAGESIC) 25 mcg/hr patch 1 Patch  1 Patch TransDERmal Q72H    haloperidol lactate (HALDOL) injection 5 mg  5 mg SubCUTAneous Q8H    morphine 10 mg/ml injection 7 mg  7 mg SubCUTAneous Q1H PRN    LORazepam (ATIVAN) injection 2 mg  2 mg IntraMUSCular Q2H PRN    polyvinyl alcohol (LIQUIFILM TEARS) 1.4 % ophthalmic solution 1 Drop  1 Drop Both Eyes PRN    acetaminophen (TYLENOL) suppository 650 mg  650 mg Rectal Q3H PRN    bisacodyl (DULCOLAX) suppository 10 mg  10 mg Rectal PRN    haloperidol lactate (HALDOL) injection 2 mg  2 mg SubCUTAneous Q1H PRN    Or    haloperidol lactate (HALDOL) injection 2 mg  2 mg IntraVENous Q1H PRN    glycopyrrolate (ROBINUL) injection 0.2 mg  0.2 mg SubCUTAneous Q4H PRN           Signed By: Thomas Denson MD     August 17, 2019

## 2019-08-17 NOTE — PROGRESS NOTES
Received bedside report from Yara Tao RN. Pt identified and noted to be resting comfortably in bed. Pt's eyes are closed and he is noted to be on 2l O2 via NC. No S&S of agitation, SOB, nausea, or vomiting. No further needs at this time. No family noted at bedside. Bed low and locked, call light within reach, tab alarm system in place. Door remains open for frequent rounding and observation. Fentanyl patch visualized on right arm. 2030- pt resting quietly with eyes closed. No distress noted. 2330- pt is resting quietly with eyes closed. RR unlabored. No distress noted. 0050- Haldol 5mg given SQ in right arm per order. Pt did not wake for medication administration. No distress noted. 0310- Morphine 7mg given SQ in left leg for s/sx of agitation and dyspnea. Pt repositioned with pillows on both sides. 0400- pt moaning and agitated due to secretions that he cannot expel, suctioned orally moderate amount of white mucus. Tony drained for 100ml concentrated urine. 5241- Report given to oncoming RN.

## 2019-08-17 NOTE — PROGRESS NOTES
Problem: Pressure Injury - Risk of  Goal: *Prevention of pressure injury  Description  Document Raul Scale and appropriate interventions in the flowsheet. Outcome: Progressing Towards Goal  Note:   Pressure Injury Interventions:  Sensory Interventions: Assess changes in LOC, Assess need for specialty bed, Avoid rigorous massage over bony prominences, Check visual cues for pain, Float heels, Keep linens dry and wrinkle-free, Minimize linen layers    Moisture Interventions: Absorbent underpads, Apply protective barrier, creams and emollients, Contain wound drainage, Internal/External urinary devices, Moisture barrier    Activity Interventions: Assess need for specialty bed    Mobility Interventions: Assess need for specialty bed, Float heels    Nutrition Interventions: Document food/fluid/supplement intake    Friction and Shear Interventions: Apply protective barrier, creams and emollients, Feet elevated on foot rest, Foam dressings/transparent film/skin sealants, Lift sheet                Problem: Falls - Risk of  Goal: *Absence of Falls  Description  Document Lea Cousin Fall Risk and appropriate interventions in the flowsheet.   Outcome: Progressing Towards Goal  Note:   Fall Risk Interventions:       Mentation Interventions: Adequate sleep, hydration, pain control, Bed/chair exit alarm, Door open when patient unattended, Evaluate medications/consider consulting pharmacy, Room close to nurse's station, Update white board    Medication Interventions: Bed/chair exit alarm    Elimination Interventions: Bed/chair exit alarm    History of Falls Interventions: Bed/chair exit alarm, Evaluate medications/consider consulting pharmacy, Door open when patient unattended, Room close to nurse's station

## 2019-08-17 NOTE — PROGRESS NOTES
The patient shift change report and walking rounds completed with Lyndsey Rodriguez RN at [id number]. The patient was identified by visual and by name and . He is GIP with hypoxic respiratory failure. We are treating dyspnea, pain, agitation and secretions with subcutaneous or IM medications. Patient was medicated several times prn during the last shift. Medications have resolved the symptoms. The patient shows no signs of distress at this time. Fentanyl 12 mcg remains on the upper left chest. The bed remains low and locked with two bed rails up for safety and tab alert in place. The patient's room is near to the nurses station for close observation. 0708- Medicated with Haldol subcutaneous for agitation and with Morphine subcutaneous for pain and dyspnea. Repositioned the patient for comfort. 0730- The patient is now resting quietly in the bed. He has shallow respirations with some gurgling. He shows no signs of distress. 0825- Medicated with scheduled Haldol and with prn Morphine subcutaneous for dyspnea and pain. The patient is grimacing and yelling out. Attempted to suction. Patient clamped jaws together. 5216- Severe troublesome secretions and agitation. Medicated with Lorazepam IM for agitation. Deep suctioned using 12 Swiss catheter. Large amount of thin white secretions out. Repositioned the patient for comfort. 0403- Patient is now resting quietly in the bed. Patient's son, Shayne, telephoned and was given an update on the patient. He voiced understanding that he knows the time of death is nearing. 1200- Patient continues to rest quietly in the bed with no signs of distress. Respirations remain shallow but unlabored. No troublesome secretions heard. 1300- Medicated with prn Morphine subcutaneous for dyspnea and with Glycopyrrolate prn for troublesome secretions. Repositioned for comfort. 1330- The medications resolved the above symptoms. Patient is resting quietly.      [address] Fentanyl patch removed and new 25 mcg Fentanyl placed to upper right arm. Sophia Bautista RN witnessed the waste. 1648- Administered Morphine 7 mg subcutaneous for tachypnea along with scheduled Haldol. The patient was turned and repositioned by the CNA for comfort. 1715- Respirations are now at 20 minute with no signs of distress observed. Will report off to the on coming RN and completed walking rounds.

## 2019-08-17 NOTE — PROGRESS NOTES
Problem: Emotional Support Needs  Goal: Patient/family is receiving emotional support  Description  Family will feel well supported while here at [facility]   Outcome: Progressing Towards Goal     Problem: Pressure Injury - Risk of  Goal: *Prevention of pressure injury  Description  Document Raul Scale and appropriate interventions in the flowsheet.   Outcome: Progressing Towards Goal  Note:   Pressure Injury Interventions:  Sensory Interventions: Assess changes in LOC, Assess need for specialty bed, Avoid rigorous massage over bony prominences, Check visual cues for pain, Float heels, Keep linens dry and wrinkle-free, Minimize linen layers    Moisture Interventions: Absorbent underpads, Apply protective barrier, creams and emollients, Contain wound drainage, Internal/External urinary devices, Moisture barrier    Activity Interventions: Assess need for specialty bed    Mobility Interventions: Assess need for specialty bed, Float heels    Nutrition Interventions: Document food/fluid/supplement intake    Friction and Shear Interventions: Apply protective barrier, creams and emollients, Feet elevated on foot rest, Foam dressings/transparent film/skin sealants, Lift sheet

## 2019-08-18 NOTE — PROGRESS NOTES
8568: Report received from off going RN. Patient resting with eyes open in bed, side rails up X 2, bed in low locked position. No s/sx of pain, agitation, dyspnea or nausea and vomiting at this time. FLACC scale rated 0/10. Fentanyl 25mcg/hr patch visualized to right arm. Patient admitted on 8/14/2019 for [facility] level of care with diagnoses of hypoxic respiratory failure with aspiration pneumonia.     0804: Patient with increased moaning and agonal breathing with audible congestion. Morphine 7mg SC and Glycopyrrolate 0.2mg SC administered to promote comfort. FLACC scale rated 8/10 at this time. 4444: Patient resting with eyes open, 02 infusing at 2L/min via NC. No further audible congestion or moaning observed. Call placed to son upon request to update patient condition and status. 1245: Patient with increased audible congestion and moaning with facial grimacing. Pain rated 6/10 per FLACC scale. Morphine 7mg SC and Glycopyrrolate 0.2mg SC administered to promote comfort. CNA at bedside to bathe patient. 1352: Patient continues to moan and yell out. Pain rated 6/10 per FLACC scale. agitation and facial grimacing also observed. Morphine 7mg SC and Haldol 2mg SC administered to promote comfort. 1430: FLACC scale now 3/10. Will continue to assess. Resps even and regular. 1545: Patient orally suctioned with [place] for moderate amount of yellowish phlegm. No gag reflex at this time. Patient opens eyes at times but otherwise unresponsive. FLACC scale 6/10 and patient administered Morphine 7mg SC to promote comfort. 1630: FLACC scale now 0/10. No further facial grimacing or moaning. 20 second periods of apnea observed. Family now at bedside and update given. 36: Educated son and daughter in law regarding end stages of dying and what to expect. Understanding voiced. Patient with FLACC scale of 0/10. Apneic episodes have increased to 30-35 second periods and resps are shallow in between.      1835: Report given to on coming RN. No family present. Patient with FLACC score of 0/10. No distress observed. Remains with apneic pauses.

## 2019-08-18 NOTE — PROGRESS NOTES
Received bedside report from Horace Jaquez RN. Pt identified and noted to be resting comfortably in bed. Pt's eyes are closed and he is noted to be on O2 @L via NC. No S&S of agitation, SOB, nausea, or vomiting. No further needs at this time. No family noted at bedside. Bed low and locked, call light within reach, tab alarm system in place. Door remains open for frequent rounding and observation. Fentanyl patch visualized on right arm.      2200- pt resting with eyes closed. Agonal breathing noted. No distress or pain noted at this time. 2350- Report given to [id number] Herrmann RN.

## 2019-08-18 NOTE — PROGRESS NOTES
Problem: Pressure Injury - Risk of  Goal: *Prevention of pressure injury  Description  Document Raul Scale and appropriate interventions in the flowsheet. Outcome: Progressing Towards Goal  Note:   Pressure Injury Interventions:  Sensory Interventions: Assess changes in LOC, Assess need for specialty bed, Avoid rigorous massage over bony prominences, Check visual cues for pain, Keep linens dry and wrinkle-free, Float heels, Maintain/enhance activity level, Minimize linen layers    Moisture Interventions: Absorbent underpads, Apply protective barrier, creams and emollients, Assess need for specialty bed, Internal/External urinary devices, Maintain skin hydration (lotion/cream), Minimize layers, Moisture barrier    Activity Interventions: Assess need for specialty bed    Mobility Interventions: Pressure redistribution bed/mattress (bed type), HOB 30 degrees or less    Nutrition Interventions: Document food/fluid/supplement intake    Friction and Shear Interventions: Apply protective barrier, creams and emollients, Feet elevated on foot rest, HOB 30 degrees or less, Lift sheet, Minimize layers                Problem: Falls - Risk of  Goal: *Absence of Falls  Description  Document Kimberly Fall Risk and appropriate interventions in the flowsheet.   Outcome: Progressing Towards Goal  Note:   Fall Risk Interventions:       Mentation Interventions: Adequate sleep, hydration, pain control, Bed/chair exit alarm, Door open when patient unattended, Evaluate medications/consider consulting pharmacy    Medication Interventions: Bed/chair exit alarm    Elimination Interventions: Bed/chair exit alarm, Call light in reach    History of Falls Interventions: Bed/chair exit alarm

## 2019-08-19 NOTE — PROGRESS NOTES
Walking round completed with off going RN. Pt identified by name/. Assumed care of pt at this time. Pt unresponsive. Respirations non labored. Facial features flat,relaxed. Flacc 0/10. Repositioned for comfort. Bed in low and locked position with side rails up x 2 and call light in reach. Door left open as pt is unaccompanied. 0154 Unresponsive. Repositioned for comfort. Respirations non labored. Flacc 0/10. All comfort measures continue.     0402 Repositioned gently for comfort into supine position and floated on pillows to offset pressure. Vitals taken,afebrile, low b/p continues. No evidence of discomfort or distress. No urine output this shift. All care continued. 0430 Morphine 7 mg subcutaneous administered for dyspnea    0505 Respirations much improved. No laboring at this time. Facial features flat,relaxed. Flacc 0/10.

## 2019-08-19 NOTE — PROGRESS NOTES
Report received from off-going nurse, Sonia Dickson RN  visual identification made, assumed care of pt. Pt resting quietly with eyes closed, no agitation or restlessness, no grimacing or groaning. Pt respirations unlabored. Tab alert in place, rails up x 2, bed in lowest position, safety maintained. FLACC 0. Visualized fentanyl patch on right arm   0745 physical assessment completed, pt non responsive, pt has periods of apnea. 0930 pt had one minute period of apnea  0935 returned call to pt's son, Viktoriya Giles. 0930 pt non responsive, not breathing, no apical pulse  0945 called Krzysztof Casanova, no answer. 8858 called Krzysztof Casanova and informed him about his father's passing, he states he will call his brother and let nurse know his plans. [address] called and stated he and his brother did not want to come, asked nurse to call [facility]. 67 pt nephew and wife at bedside  [address] from [facility] here to transport pt.

## 2019-08-19 NOTE — [facility] IDG CHAPLAIN NOTES
Patient: Mark Hubbard    Date: 08/19/19  Time: 9:48 AM    [facility]  Notes  Intervention: Ministry of presence, music, prayer and provision of a [employer]. Outcome: Patient appeared to be obtunded. Eyes open but nonverbal.  No family present.     Progressing toward the goals    Signed by: Germán Overton

## 2019-08-19 NOTE — [facility] IDG NURSE NOTES
Patient: Jaimie Preston    Date: 08/19/19  Time: 11:51 AM    [facility] Nurse Notes  Patient passed this am         Signed by: Keysha Stock RN

## 2021-01-28 NOTE — [facility] IDG VOLUNTEER NOTES
Patient: Dejan Conley    Date: 08/16/19  Time: 12:52 PM    [facility]  Volunteer - Hospice Interdisciplinary Plan of Care Review     Status Codes C=Continued R=Revised RS = Resolved  NV= No visits per Infection Control Policy     C. Volunteer     Volunteers continue to provide visits for companionship and emotional support.              Signed by: Trevon Gambino Problem: PAIN - ADULT  Goal: Verbalizes/displays adequate comfort level or baseline comfort level  Description: Interventions:  - Encourage patient to monitor pain and request assistance  - Assess pain using appropriate pain scale  - Administer analgesics based on type and severity of pain and evaluate response  - Implement non-pharmacological measures as appropriate and evaluate response  - Consider cultural and social influences on pain and pain management  - Notify physician/advanced practitioner if interventions unsuccessful or patient reports new pain  Outcome: Progressing     Problem: INFECTION - ADULT  Goal: Absence or prevention of progression during hospitalization  Description: INTERVENTIONS:  - Assess and monitor for signs and symptoms of infection  - Monitor lab/diagnostic results  - Monitor all insertion sites, i e  indwelling lines, tubes, and drains  - Monitor endotracheal if appropriate and nasal secretions for changes in amount and color  - Page appropriate cooling/warming therapies per order  - Administer medications as ordered  - Instruct and encourage patient and family to use good hand hygiene technique  - Identify and instruct in appropriate isolation precautions for identified infection/condition  Outcome: Progressing  Goal: Absence of fever/infection during neutropenic period  Description: INTERVENTIONS:  - Monitor WBC    Outcome: Progressing     Problem: SAFETY ADULT  Goal: Patient will remain free of falls  Description: INTERVENTIONS:  - Assess patient frequently for physical needs  -  Identify cognitive and physical deficits and behaviors that affect risk of falls    -  Page fall precautions as indicated by assessment   - Educate patient/family on patient safety including physical limitations  - Instruct patient to call for assistance with activity based on assessment  - Modify environment to reduce risk of injury  - Consider OT/PT consult to assist with strengthening/mobility  Outcome: Progressing  Goal: Maintain or return to baseline ADL function  Description: INTERVENTIONS:  -  Assess patient's ability to carry out ADLs; assess patient's baseline for ADL function and identify physical deficits which impact ability to perform ADLs (bathing, care of mouth/teeth, toileting, grooming, dressing, etc )  - Assess/evaluate cause of self-care deficits   - Assess range of motion  - Assess patient's mobility; develop plan if impaired  - Assess patient's need for assistive devices and provide as appropriate  - Encourage maximum independence but intervene and supervise when necessary  - Involve family in performance of ADLs  - Assess for home care needs following discharge   - Consider OT consult to assist with ADL evaluation and planning for discharge  - Provide patient education as appropriate  Outcome: Progressing  Goal: Maintain or return mobility status to optimal level  Description: INTERVENTIONS:  - Assess patient's baseline mobility status (ambulation, transfers, stairs, etc )    - Identify cognitive and physical deficits and behaviors that affect mobility  - Identify mobility aids required to assist with transfers and/or ambulation (gait belt, sit-to-stand, lift, walker, cane, etc )  - [place] fall precautions as indicated by assessment  - Record patient progress and toleration of activity level on Mobility SBAR; progress patient to next Phase/Stage  - Instruct patient to call for assistance with activity based on assessment  - Consider rehabilitation consult to assist with strengthening/weightbearing, etc   Outcome: Progressing     Problem: DISCHARGE PLANNING  Goal: Discharge to home or other facility with appropriate resources  Description: INTERVENTIONS:  - Identify barriers to discharge w/patient and caregiver  - Arrange for needed discharge resources and transportation as appropriate  - Identify discharge learning needs (meds, wound care, etc )  - Arrange for interpretive services to assist at discharge as needed  - Refer to Case Management Department for coordinating discharge planning if the patient needs post-hospital services based on physician/advanced practitioner order or complex needs related to functional status, cognitive ability, or social support system  Outcome: Progressing     Problem: Knowledge Deficit  Goal: Patient/family/caregiver demonstrates understanding of disease process, treatment plan, medications, and discharge instructions  Description: Complete learning assessment and assess knowledge base    Interventions:  - Provide teaching at level of understanding  - Provide teaching via preferred learning methods  Outcome: Progressing